# Patient Record
Sex: MALE | Race: WHITE | Employment: OTHER | URBAN - METROPOLITAN AREA
[De-identification: names, ages, dates, MRNs, and addresses within clinical notes are randomized per-mention and may not be internally consistent; named-entity substitution may affect disease eponyms.]

---

## 2017-02-14 ENCOUNTER — COMMUNICATION - HEALTHEAST (OUTPATIENT)
Dept: INTERNAL MEDICINE | Facility: CLINIC | Age: 62
End: 2017-02-14

## 2017-05-08 ENCOUNTER — COMMUNICATION - HEALTHEAST (OUTPATIENT)
Dept: INTERNAL MEDICINE | Facility: CLINIC | Age: 62
End: 2017-05-08

## 2017-07-21 ENCOUNTER — COMMUNICATION - HEALTHEAST (OUTPATIENT)
Dept: INTERNAL MEDICINE | Facility: CLINIC | Age: 62
End: 2017-07-21

## 2017-07-21 ENCOUNTER — OFFICE VISIT - HEALTHEAST (OUTPATIENT)
Dept: INTERNAL MEDICINE | Facility: CLINIC | Age: 62
End: 2017-07-21

## 2017-07-21 DIAGNOSIS — E78.00 HYPERCHOLESTEREMIA: ICD-10-CM

## 2017-07-21 DIAGNOSIS — I10 ESSENTIAL HYPERTENSION: ICD-10-CM

## 2017-07-21 DIAGNOSIS — Z00.00 ROUTINE GENERAL MEDICAL EXAMINATION AT A HEALTH CARE FACILITY: ICD-10-CM

## 2017-07-21 DIAGNOSIS — H91.93 HEARING LOSS, BILATERAL: ICD-10-CM

## 2017-07-21 DIAGNOSIS — M17.0 PRIMARY OSTEOARTHRITIS OF BOTH KNEES: ICD-10-CM

## 2017-07-21 LAB
CHOLEST SERPL-MCNC: 184 MG/DL
FASTING STATUS PATIENT QL REPORTED: YES
HCV AB SERPL QL IA: NEGATIVE
HDLC SERPL-MCNC: 42 MG/DL
LDLC SERPL CALC-MCNC: 122 MG/DL
PSA SERPL-MCNC: 1 NG/ML (ref 0–4.5)
TRIGL SERPL-MCNC: 102 MG/DL

## 2017-07-21 ASSESSMENT — MIFFLIN-ST. JEOR: SCORE: 1736.08

## 2017-11-16 ENCOUNTER — COMMUNICATION - HEALTHEAST (OUTPATIENT)
Dept: INTERNAL MEDICINE | Facility: CLINIC | Age: 62
End: 2017-11-16

## 2018-08-09 ENCOUNTER — COMMUNICATION - HEALTHEAST (OUTPATIENT)
Dept: INTERNAL MEDICINE | Facility: CLINIC | Age: 63
End: 2018-08-09

## 2018-08-09 DIAGNOSIS — I10 HTN (HYPERTENSION): ICD-10-CM

## 2018-11-06 ENCOUNTER — COMMUNICATION - HEALTHEAST (OUTPATIENT)
Dept: INTERNAL MEDICINE | Facility: CLINIC | Age: 63
End: 2018-11-06

## 2018-11-06 DIAGNOSIS — I10 HTN (HYPERTENSION): ICD-10-CM

## 2018-11-16 ENCOUNTER — OFFICE VISIT - HEALTHEAST (OUTPATIENT)
Dept: INTERNAL MEDICINE | Facility: CLINIC | Age: 63
End: 2018-11-16

## 2018-11-16 DIAGNOSIS — E78.00 HYPERCHOLESTEREMIA: ICD-10-CM

## 2018-11-16 DIAGNOSIS — Z00.00 INITIAL MEDICARE ANNUAL WELLNESS VISIT: ICD-10-CM

## 2018-11-16 DIAGNOSIS — Z12.5 SCREENING FOR PROSTATE CANCER: ICD-10-CM

## 2018-11-16 DIAGNOSIS — I10 ESSENTIAL HYPERTENSION: ICD-10-CM

## 2018-11-16 DIAGNOSIS — H91.93 BILATERAL HEARING LOSS, UNSPECIFIED HEARING LOSS TYPE: ICD-10-CM

## 2018-11-16 DIAGNOSIS — M17.0 PRIMARY OSTEOARTHRITIS OF BOTH KNEES: ICD-10-CM

## 2018-11-16 DIAGNOSIS — Z82.49 FAMILY HISTORY OF PREMATURE CORONARY ARTERY DISEASE: ICD-10-CM

## 2018-11-16 ASSESSMENT — MIFFLIN-ST. JEOR: SCORE: 1749.69

## 2018-12-14 ENCOUNTER — AMBULATORY - HEALTHEAST (OUTPATIENT)
Dept: INTERNAL MEDICINE | Facility: CLINIC | Age: 63
End: 2018-12-14

## 2018-12-14 ENCOUNTER — OFFICE VISIT - HEALTHEAST (OUTPATIENT)
Dept: INTERNAL MEDICINE | Facility: CLINIC | Age: 63
End: 2018-12-14

## 2018-12-14 ENCOUNTER — COMMUNICATION - HEALTHEAST (OUTPATIENT)
Dept: INTERNAL MEDICINE | Facility: CLINIC | Age: 63
End: 2018-12-14

## 2018-12-14 DIAGNOSIS — I10 ESSENTIAL HYPERTENSION: ICD-10-CM

## 2018-12-14 DIAGNOSIS — Z12.5 SCREENING FOR PROSTATE CANCER: ICD-10-CM

## 2018-12-14 DIAGNOSIS — E78.00 HYPERCHOLESTEREMIA: ICD-10-CM

## 2018-12-14 DIAGNOSIS — Z51.81 MEDICATION MONITORING ENCOUNTER: ICD-10-CM

## 2018-12-14 DIAGNOSIS — R73.01 IMPAIRED FASTING GLUCOSE: ICD-10-CM

## 2018-12-14 LAB
ALBUMIN SERPL-MCNC: 3.9 G/DL (ref 3.5–5)
ALBUMIN UR-MCNC: NEGATIVE MG/DL
ALP SERPL-CCNC: 64 U/L (ref 45–120)
ALT SERPL W P-5'-P-CCNC: 25 U/L (ref 0–45)
ANION GAP SERPL CALCULATED.3IONS-SCNC: 10 MMOL/L (ref 5–18)
APPEARANCE UR: CLEAR
AST SERPL W P-5'-P-CCNC: 22 U/L (ref 0–40)
BACTERIA #/AREA URNS HPF: ABNORMAL HPF
BILIRUB SERPL-MCNC: 0.9 MG/DL (ref 0–1)
BILIRUB UR QL STRIP: NEGATIVE
BUN SERPL-MCNC: 16 MG/DL (ref 8–22)
CALCIUM SERPL-MCNC: 9.9 MG/DL (ref 8.5–10.5)
CHLORIDE BLD-SCNC: 100 MMOL/L (ref 98–107)
CHOLEST SERPL-MCNC: 201 MG/DL
CO2 SERPL-SCNC: 28 MMOL/L (ref 22–31)
COLOR UR AUTO: YELLOW
CREAT SERPL-MCNC: 0.88 MG/DL (ref 0.7–1.3)
FASTING STATUS PATIENT QL REPORTED: YES
GFR SERPL CREATININE-BSD FRML MDRD: >60 ML/MIN/1.73M2
GLUCOSE BLD-MCNC: 113 MG/DL (ref 70–125)
GLUCOSE UR STRIP-MCNC: NEGATIVE MG/DL
HDLC SERPL-MCNC: 45 MG/DL
HGB BLD-MCNC: 16.3 G/DL (ref 14–18)
HGB UR QL STRIP: ABNORMAL
KETONES UR STRIP-MCNC: NEGATIVE MG/DL
LDLC SERPL CALC-MCNC: 136 MG/DL
LEUKOCYTE ESTERASE UR QL STRIP: NEGATIVE
MAGNESIUM SERPL-MCNC: 2 MG/DL (ref 1.8–2.6)
NITRATE UR QL: NEGATIVE
PH UR STRIP: 7 [PH] (ref 5–8)
POTASSIUM BLD-SCNC: 4.8 MMOL/L (ref 3.5–5)
PROT SERPL-MCNC: 7.4 G/DL (ref 6–8)
PSA SERPL-MCNC: 1.5 NG/ML (ref 0–4.5)
RBC #/AREA URNS AUTO: ABNORMAL HPF
SODIUM SERPL-SCNC: 138 MMOL/L (ref 136–145)
SP GR UR STRIP: 1.01 (ref 1–1.03)
SQUAMOUS #/AREA URNS AUTO: ABNORMAL LPF
TRIGL SERPL-MCNC: 101 MG/DL
UROBILINOGEN UR STRIP-ACNC: ABNORMAL
WBC #/AREA URNS AUTO: ABNORMAL HPF

## 2018-12-14 ASSESSMENT — MIFFLIN-ST. JEOR: SCORE: 1727.01

## 2019-02-04 ENCOUNTER — AMBULATORY - HEALTHEAST (OUTPATIENT)
Dept: INTERNAL MEDICINE | Facility: CLINIC | Age: 64
End: 2019-02-04

## 2019-02-04 ENCOUNTER — COMMUNICATION - HEALTHEAST (OUTPATIENT)
Dept: SCHEDULING | Facility: CLINIC | Age: 64
End: 2019-02-04

## 2019-02-28 ENCOUNTER — COMMUNICATION - HEALTHEAST (OUTPATIENT)
Dept: SCHEDULING | Facility: CLINIC | Age: 64
End: 2019-02-28

## 2019-03-05 ENCOUNTER — OFFICE VISIT - HEALTHEAST (OUTPATIENT)
Dept: CARDIOLOGY | Facility: CLINIC | Age: 64
End: 2019-03-05

## 2019-03-05 DIAGNOSIS — I10 ESSENTIAL HYPERTENSION: ICD-10-CM

## 2019-03-05 DIAGNOSIS — R07.89 OTHER CHEST PAIN: ICD-10-CM

## 2019-03-05 LAB
ATRIAL RATE - MUSE: 72 BPM
DIASTOLIC BLOOD PRESSURE - MUSE: NORMAL MMHG
INTERPRETATION ECG - MUSE: NORMAL
P AXIS - MUSE: 54 DEGREES
PR INTERVAL - MUSE: 148 MS
QRS DURATION - MUSE: 90 MS
QT - MUSE: 368 MS
QTC - MUSE: 402 MS
R AXIS - MUSE: 42 DEGREES
SYSTOLIC BLOOD PRESSURE - MUSE: NORMAL MMHG
T AXIS - MUSE: 65 DEGREES
VENTRICULAR RATE- MUSE: 72 BPM

## 2019-03-05 ASSESSMENT — MIFFLIN-ST. JEOR: SCORE: 1704.33

## 2019-03-07 ENCOUNTER — HOSPITAL ENCOUNTER (OUTPATIENT)
Dept: NUCLEAR MEDICINE | Facility: CLINIC | Age: 64
Discharge: HOME | End: 2019-03-07
Attending: INTERNAL MEDICINE

## 2019-03-07 ENCOUNTER — HOSPITAL ENCOUNTER (OUTPATIENT)
Dept: CARDIOLOGY | Facility: CLINIC | Age: 64
Discharge: HOME | End: 2019-03-07
Attending: INTERNAL MEDICINE

## 2019-03-07 LAB
AORTIC ROOT: 3.6 CM
ASCENDING AORTA: 4.1 CM
BSA FOR ECHO PROCEDURE: 2.14 M2
CV ECHO HEIGHT: 69 IN
CV ECHO WEIGHT: 207 LBS
CV STRESS CURRENT BP HE: NORMAL
CV STRESS CURRENT HR HE: 101
CV STRESS CURRENT HR HE: 102
CV STRESS CURRENT HR HE: 104
CV STRESS CURRENT HR HE: 105
CV STRESS CURRENT HR HE: 105
CV STRESS CURRENT HR HE: 107
CV STRESS CURRENT HR HE: 109
CV STRESS CURRENT HR HE: 110
CV STRESS CURRENT HR HE: 116
CV STRESS CURRENT HR HE: 121
CV STRESS CURRENT HR HE: 122
CV STRESS CURRENT HR HE: 123
CV STRESS CURRENT HR HE: 131
CV STRESS CURRENT HR HE: 132
CV STRESS CURRENT HR HE: 132
CV STRESS CURRENT HR HE: 136
CV STRESS CURRENT HR HE: 137
CV STRESS CURRENT HR HE: 80
CV STRESS CURRENT HR HE: 92
CV STRESS CURRENT HR HE: 94
CV STRESS CURRENT HR HE: 94
CV STRESS CURRENT HR HE: 96
CV STRESS CURRENT HR HE: 98
CV STRESS CURRENT HR HE: 98
CV STRESS DEVIATION TIME HE: NORMAL
CV STRESS ECHO PERCENT HR HE: NORMAL
CV STRESS EXERCISE STAGE HE: NORMAL
CV STRESS EXERCISE STAGE REACHED HE: NORMAL
CV STRESS FINAL RESTING BP HE: NORMAL
CV STRESS FINAL RESTING HR HE: 94
CV STRESS MAX HR HE: 137
CV STRESS MAX TREADMILL GRADE HE: 14
CV STRESS MAX TREADMILL SPEED HE: 3.4
CV STRESS PEAK DIA BP HE: NORMAL
CV STRESS PEAK SYS BP HE: NORMAL
CV STRESS PHASE HE: NORMAL
CV STRESS PROTOCOL HE: NORMAL
CV STRESS RESTING PT POSITION HE: NORMAL
CV STRESS RESTING PT POSITION HE: NORMAL
CV STRESS ST DEVIATION AMOUNT HE: NORMAL
CV STRESS ST DEVIATION ELEVATION HE: NORMAL
CV STRESS ST EVELATION AMOUNT HE: NORMAL
CV STRESS TEST TYPE HE: NORMAL
CV STRESS TOTAL STAGE TIME MIN 1 HE: NORMAL
DOP CALC LVOT AREA: 3.8 CM2
DOP CALC LVOT DIAMETER: 2.2 CM
DOP CALC LVOT PEAK VEL: 84.4 CM/S
DOP CALC LVOT STROKE VOLUME: 71 CM3
DOP CALCLVOT PEAK VEL VTI: 18.7 CM
EJECTION FRACTION: 69 % (ref 55–75)
FRACTIONAL SHORTENING: 31.3 % (ref 28–44)
INTERVENTRICULAR SEPTUM IN END DIASTOLE: 1.2 CM (ref 0.6–1)
IVS/PW RATIO: 1
LA AREA 1: 12.5 CM2
LA AREA 2: 14.6 CM2
LEFT ATRIUM LENGTH: 4.38 CM
LEFT ATRIUM SIZE: 3.2 CM
LEFT ATRIUM TO AORTIC ROOT RATIO: 0.89 NO UNITS
LEFT ATRIUM VOLUME INDEX: 16.5 ML/M2
LEFT ATRIUM VOLUME: 35.4 ML
LEFT VENTRICLE CARDIAC INDEX: 2.4 L/MIN/M2
LEFT VENTRICLE CARDIAC OUTPUT: 5.2 L/MIN
LEFT VENTRICLE DIASTOLIC VOLUME INDEX: 46.7 CM3/M2 (ref 34–74)
LEFT VENTRICLE DIASTOLIC VOLUME: 100 CM3 (ref 62–150)
LEFT VENTRICLE HEART RATE: 73 BPM
LEFT VENTRICLE MASS INDEX: 102.4 G/M2
LEFT VENTRICLE SYSTOLIC VOLUME INDEX: 14.5 CM3/M2 (ref 11–31)
LEFT VENTRICLE SYSTOLIC VOLUME: 31 CM3 (ref 21–61)
LEFT VENTRICULAR INTERNAL DIMENSION IN DIASTOLE: 4.8 CM (ref 4.2–5.8)
LEFT VENTRICULAR INTERNAL DIMENSION IN SYSTOLE: 3.3 CM (ref 2.5–4)
LEFT VENTRICULAR MASS: 219.1 G
LEFT VENTRICULAR OUTFLOW TRACT MEAN GRADIENT: 1 MMHG
LEFT VENTRICULAR OUTFLOW TRACT MEAN VELOCITY: 55.5 CM/S
LEFT VENTRICULAR OUTFLOW TRACT PEAK GRADIENT: 3 MMHG
LEFT VENTRICULAR POSTERIOR WALL IN END DIASTOLE: 1.2 CM (ref 0.6–1)
LV STROKE VOLUME INDEX: 33.2 ML/M2
MITRAL VALVE E/A RATIO: 0.7
MV AVERAGE E/E' RATIO: 7.7 CM/S
MV DECELERATION TIME: 359 MS
MV E'TISSUE VEL-LAT: 10.8 CM/S
MV E'TISSUE VEL-MED: 7.9 CM/S
MV LATERAL E/E' RATIO: 6.6
MV MEDIAL E/E' RATIO: 9.1
MV PEAK A VELOCITY: 104 CM/S
MV PEAK E VELOCITY: 71.8 CM/S
NUC REST DIASTOLIC VOLUME INDEX: 3312 LBS
NUC REST SYSTOLIC VOLUME INDEX: 69 IN
NUC STRESS EJECTION FRACTION: 63 %
RIGHT VENTRICULAR INTERNAL DIMENSION IN DYSTOLE: 3.5 CM
STRESS ECHO BASELINE BP: NORMAL
STRESS ECHO BASELINE HR: 102
STRESS ECHO CALCULATED PERCENT HR: 88 %
STRESS ECHO LAST STRESS BP: NORMAL
STRESS ECHO LAST STRESS HR: 137
STRESS ECHO POST ESTIMATED WORKLOAD: 10.3
STRESS ECHO POST EXERCISE DUR MIN: 8
STRESS ECHO POST EXERCISE DUR SEC: 59
STRESS ECHO TARGET HR: 132
TRICUSPID VALVE ANULAR PLANE SYSTOLIC EXCURSION: 1.8 CM

## 2019-03-07 ASSESSMENT — MIFFLIN-ST. JEOR: SCORE: 1704.33

## 2019-04-11 ENCOUNTER — OFFICE VISIT - HEALTHEAST (OUTPATIENT)
Dept: CARDIOLOGY | Facility: CLINIC | Age: 64
End: 2019-04-11

## 2019-04-11 DIAGNOSIS — I71.40 ABDOMINAL AORTIC ANEURYSM (H): ICD-10-CM

## 2019-04-11 DIAGNOSIS — I10 ESSENTIAL HYPERTENSION: ICD-10-CM

## 2019-04-11 ASSESSMENT — MIFFLIN-ST. JEOR: SCORE: 1717.93

## 2019-08-05 ENCOUNTER — AMBULATORY - HEALTHEAST (OUTPATIENT)
Dept: INTERNAL MEDICINE | Facility: CLINIC | Age: 64
End: 2019-08-05

## 2019-08-05 ENCOUNTER — COMMUNICATION - HEALTHEAST (OUTPATIENT)
Dept: INTERNAL MEDICINE | Facility: CLINIC | Age: 64
End: 2019-08-05

## 2019-08-05 DIAGNOSIS — I10 ESSENTIAL HYPERTENSION: ICD-10-CM

## 2019-08-20 ENCOUNTER — OFFICE VISIT - HEALTHEAST (OUTPATIENT)
Dept: FAMILY MEDICINE | Facility: CLINIC | Age: 64
End: 2019-08-20

## 2019-08-20 ENCOUNTER — COMMUNICATION - HEALTHEAST (OUTPATIENT)
Dept: SCHEDULING | Facility: CLINIC | Age: 64
End: 2019-08-20

## 2019-08-20 DIAGNOSIS — J40 BRONCHITIS: ICD-10-CM

## 2019-11-05 ENCOUNTER — OFFICE VISIT - HEALTHEAST (OUTPATIENT)
Dept: INTERNAL MEDICINE | Facility: CLINIC | Age: 64
End: 2019-11-05

## 2019-11-05 DIAGNOSIS — Z12.5 SCREENING FOR MALIGNANT NEOPLASM OF PROSTATE: ICD-10-CM

## 2019-11-05 DIAGNOSIS — I10 ESSENTIAL HYPERTENSION: ICD-10-CM

## 2019-11-05 DIAGNOSIS — R07.9 LEFT-SIDED CHEST PAIN: ICD-10-CM

## 2019-11-05 DIAGNOSIS — E78.00 HYPERCHOLESTEREMIA: ICD-10-CM

## 2019-11-05 DIAGNOSIS — Z86.0100 PERSONAL HISTORY OF COLONIC POLYPS: ICD-10-CM

## 2019-11-05 DIAGNOSIS — R73.01 IMPAIRED FASTING GLUCOSE: ICD-10-CM

## 2019-11-05 DIAGNOSIS — Z00.00 MEDICARE ANNUAL WELLNESS VISIT, SUBSEQUENT: ICD-10-CM

## 2019-11-05 DIAGNOSIS — I77.810 ASCENDING AORTA DILATATION (H): ICD-10-CM

## 2019-11-05 DIAGNOSIS — M17.0 PRIMARY OSTEOARTHRITIS OF BOTH KNEES: ICD-10-CM

## 2019-11-05 DIAGNOSIS — Z82.49 FAMILY HISTORY OF PREMATURE CORONARY ARTERY DISEASE: ICD-10-CM

## 2019-11-05 ASSESSMENT — MIFFLIN-ST. JEOR: SCORE: 1740.61

## 2019-11-12 ENCOUNTER — AMBULATORY - HEALTHEAST (OUTPATIENT)
Dept: LAB | Facility: CLINIC | Age: 64
End: 2019-11-12

## 2019-11-12 DIAGNOSIS — R73.01 IMPAIRED FASTING GLUCOSE: ICD-10-CM

## 2019-11-12 DIAGNOSIS — I10 ESSENTIAL HYPERTENSION: ICD-10-CM

## 2019-11-12 DIAGNOSIS — Z12.5 SCREENING FOR MALIGNANT NEOPLASM OF PROSTATE: ICD-10-CM

## 2019-11-12 DIAGNOSIS — E78.00 HYPERCHOLESTEREMIA: ICD-10-CM

## 2019-11-12 LAB
ALBUMIN SERPL-MCNC: 3.9 G/DL (ref 3.5–5)
ALBUMIN UR-MCNC: NEGATIVE MG/DL
ALP SERPL-CCNC: 75 U/L (ref 45–120)
ALT SERPL W P-5'-P-CCNC: 17 U/L (ref 0–45)
ANION GAP SERPL CALCULATED.3IONS-SCNC: 11 MMOL/L (ref 5–18)
APPEARANCE UR: CLEAR
AST SERPL W P-5'-P-CCNC: 22 U/L (ref 0–40)
BACTERIA #/AREA URNS HPF: ABNORMAL HPF
BILIRUB SERPL-MCNC: 0.5 MG/DL (ref 0–1)
BILIRUB UR QL STRIP: NEGATIVE
BUN SERPL-MCNC: 12 MG/DL (ref 8–22)
CALCIUM SERPL-MCNC: 9.2 MG/DL (ref 8.5–10.5)
CHLORIDE BLD-SCNC: 104 MMOL/L (ref 98–107)
CHOLEST SERPL-MCNC: 200 MG/DL
CO2 SERPL-SCNC: 25 MMOL/L (ref 22–31)
COLOR UR AUTO: YELLOW
CREAT SERPL-MCNC: 0.89 MG/DL (ref 0.7–1.3)
ERYTHROCYTE [DISTWIDTH] IN BLOOD BY AUTOMATED COUNT: 12.2 % (ref 11–14.5)
FASTING STATUS PATIENT QL REPORTED: YES
GFR SERPL CREATININE-BSD FRML MDRD: >60 ML/MIN/1.73M2
GLUCOSE BLD-MCNC: 105 MG/DL (ref 70–125)
GLUCOSE UR STRIP-MCNC: NEGATIVE MG/DL
HBA1C MFR BLD: 5.6 % (ref 3.5–6)
HCT VFR BLD AUTO: 48.3 % (ref 40–54)
HDLC SERPL-MCNC: 40 MG/DL
HGB BLD-MCNC: 16.2 G/DL (ref 14–18)
HGB UR QL STRIP: ABNORMAL
KETONES UR STRIP-MCNC: NEGATIVE MG/DL
LDLC SERPL CALC-MCNC: 140 MG/DL
LEUKOCYTE ESTERASE UR QL STRIP: NEGATIVE
MCH RBC QN AUTO: 30 PG (ref 27–34)
MCHC RBC AUTO-ENTMCNC: 33.5 G/DL (ref 32–36)
MCV RBC AUTO: 90 FL (ref 80–100)
NITRATE UR QL: NEGATIVE
PH UR STRIP: 6.5 [PH] (ref 5–8)
PLATELET # BLD AUTO: 208 THOU/UL (ref 140–440)
PMV BLD AUTO: 7.3 FL (ref 7–10)
POTASSIUM BLD-SCNC: 4.7 MMOL/L (ref 3.5–5)
PROT SERPL-MCNC: 7.3 G/DL (ref 6–8)
PSA SERPL-MCNC: 1.1 NG/ML (ref 0–4.5)
RBC # BLD AUTO: 5.39 MILL/UL (ref 4.4–6.2)
RBC #/AREA URNS AUTO: ABNORMAL HPF
SODIUM SERPL-SCNC: 140 MMOL/L (ref 136–145)
SP GR UR STRIP: 1.01 (ref 1–1.03)
SQUAMOUS #/AREA URNS AUTO: ABNORMAL LPF
TRIGL SERPL-MCNC: 102 MG/DL
UROBILINOGEN UR STRIP-ACNC: ABNORMAL
WBC #/AREA URNS AUTO: ABNORMAL HPF
WBC: 5.9 THOU/UL (ref 4–11)

## 2020-03-02 ENCOUNTER — COMMUNICATION - HEALTHEAST (OUTPATIENT)
Dept: INTERNAL MEDICINE | Facility: CLINIC | Age: 65
End: 2020-03-02

## 2020-07-28 ENCOUNTER — COMMUNICATION - HEALTHEAST (OUTPATIENT)
Dept: INTERNAL MEDICINE | Facility: CLINIC | Age: 65
End: 2020-07-28

## 2020-07-28 DIAGNOSIS — I10 ESSENTIAL HYPERTENSION: ICD-10-CM

## 2020-11-12 ENCOUNTER — COMMUNICATION - HEALTHEAST (OUTPATIENT)
Dept: INTERNAL MEDICINE | Facility: CLINIC | Age: 65
End: 2020-11-12

## 2020-11-12 DIAGNOSIS — I10 ESSENTIAL HYPERTENSION: ICD-10-CM

## 2021-02-17 ENCOUNTER — COMMUNICATION - HEALTHEAST (OUTPATIENT)
Dept: INTERNAL MEDICINE | Facility: CLINIC | Age: 66
End: 2021-02-17

## 2021-02-17 DIAGNOSIS — I10 ESSENTIAL HYPERTENSION: ICD-10-CM

## 2021-03-04 ENCOUNTER — COMMUNICATION - HEALTHEAST (OUTPATIENT)
Dept: ADMINISTRATIVE | Facility: CLINIC | Age: 66
End: 2021-03-04

## 2021-03-05 ENCOUNTER — OFFICE VISIT - HEALTHEAST (OUTPATIENT)
Dept: INTERNAL MEDICINE | Facility: CLINIC | Age: 66
End: 2021-03-05

## 2021-03-05 DIAGNOSIS — Z82.49 FAMILY HISTORY OF PREMATURE CORONARY ARTERY DISEASE: ICD-10-CM

## 2021-03-05 DIAGNOSIS — H91.93 BILATERAL HEARING LOSS, UNSPECIFIED HEARING LOSS TYPE: ICD-10-CM

## 2021-03-05 DIAGNOSIS — I10 ESSENTIAL HYPERTENSION: ICD-10-CM

## 2021-03-05 DIAGNOSIS — E78.00 HYPERCHOLESTEREMIA: ICD-10-CM

## 2021-03-05 DIAGNOSIS — Z00.00 MEDICARE ANNUAL WELLNESS VISIT, SUBSEQUENT: ICD-10-CM

## 2021-03-05 DIAGNOSIS — I77.810 ASCENDING AORTA DILATATION (H): ICD-10-CM

## 2021-03-05 DIAGNOSIS — Z12.5 SCREENING FOR MALIGNANT NEOPLASM OF PROSTATE: ICD-10-CM

## 2021-03-05 LAB
ALBUMIN SERPL-MCNC: 3.9 G/DL (ref 3.5–5)
ALBUMIN UR-MCNC: NEGATIVE MG/DL
ALP SERPL-CCNC: 81 U/L (ref 45–120)
ALT SERPL W P-5'-P-CCNC: 18 U/L (ref 0–45)
ANION GAP SERPL CALCULATED.3IONS-SCNC: 10 MMOL/L (ref 5–18)
APPEARANCE UR: CLEAR
AST SERPL W P-5'-P-CCNC: 26 U/L (ref 0–40)
BILIRUB SERPL-MCNC: 0.7 MG/DL (ref 0–1)
BILIRUB UR QL STRIP: NEGATIVE
BUN SERPL-MCNC: 13 MG/DL (ref 8–22)
CALCIUM SERPL-MCNC: 8.7 MG/DL (ref 8.5–10.5)
CHLORIDE BLD-SCNC: 102 MMOL/L (ref 98–107)
CHOLEST SERPL-MCNC: 173 MG/DL
CO2 SERPL-SCNC: 28 MMOL/L (ref 22–31)
COLOR UR AUTO: YELLOW
CREAT SERPL-MCNC: 0.82 MG/DL (ref 0.7–1.3)
ERYTHROCYTE [DISTWIDTH] IN BLOOD BY AUTOMATED COUNT: 12.3 % (ref 11–14.5)
FASTING STATUS PATIENT QL REPORTED: ABNORMAL
GFR SERPL CREATININE-BSD FRML MDRD: >60 ML/MIN/1.73M2
GLUCOSE BLD-MCNC: 91 MG/DL (ref 70–125)
GLUCOSE UR STRIP-MCNC: NEGATIVE MG/DL
HCT VFR BLD AUTO: 45.3 % (ref 40–54)
HDLC SERPL-MCNC: 38 MG/DL
HGB BLD-MCNC: 15.3 G/DL (ref 14–18)
HGB UR QL STRIP: NEGATIVE
KETONES UR STRIP-MCNC: NEGATIVE MG/DL
LDLC SERPL CALC-MCNC: 113 MG/DL
LEUKOCYTE ESTERASE UR QL STRIP: NEGATIVE
MCH RBC QN AUTO: 29.8 PG (ref 27–34)
MCHC RBC AUTO-ENTMCNC: 33.8 G/DL (ref 32–36)
MCV RBC AUTO: 88 FL (ref 80–100)
NITRATE UR QL: NEGATIVE
PH UR STRIP: 5.5 [PH] (ref 5–8)
PLATELET # BLD AUTO: 208 THOU/UL (ref 140–440)
PMV BLD AUTO: 9.2 FL (ref 7–10)
POTASSIUM BLD-SCNC: 4.4 MMOL/L (ref 3.5–5)
PROT SERPL-MCNC: 7.1 G/DL (ref 6–8)
PSA SERPL-MCNC: 1.1 NG/ML (ref 0–4.5)
RBC # BLD AUTO: 5.14 MILL/UL (ref 4.4–6.2)
SODIUM SERPL-SCNC: 140 MMOL/L (ref 136–145)
SP GR UR STRIP: 1.01 (ref 1–1.03)
TRIGL SERPL-MCNC: 109 MG/DL
UROBILINOGEN UR STRIP-ACNC: NORMAL
WBC: 6.9 THOU/UL (ref 4–11)

## 2021-03-05 ASSESSMENT — MIFFLIN-ST. JEOR: SCORE: 1745.72

## 2021-03-23 ENCOUNTER — COMMUNICATION - HEALTHEAST (OUTPATIENT)
Dept: INTERNAL MEDICINE | Facility: CLINIC | Age: 66
End: 2021-03-23

## 2021-03-23 DIAGNOSIS — I10 ESSENTIAL HYPERTENSION: ICD-10-CM

## 2021-03-24 RX ORDER — LISINOPRIL 20 MG/1
TABLET ORAL
Qty: 135 TABLET | Refills: 3 | Status: SHIPPED | OUTPATIENT
Start: 2021-03-24 | End: 2022-03-15

## 2021-05-31 VITALS — HEIGHT: 69 IN | WEIGHT: 214 LBS | BODY MASS INDEX: 31.7 KG/M2

## 2021-05-31 NOTE — TELEPHONE ENCOUNTER
Medication Question or Clarification  Who is calling: Patient  What medication are you calling about? (include dose and sig) Lisinopril-HCTZ, 20-12.5 mg, one daily  Who prescribed the medication?: Jc Rg MD   What is your question/concern?: Patient believed this medication was the cause of adverse reactions of trouble sleeping and feeling anxious.  His symptoms were enough that the had a cardiac workshop in March, 2019.  There was nothing wrong with his heart.  In May we went back to taking plain lisinopril and found it was not having those reactions.  He did a trial back on the combination drug and reactions reappeared.  He has not been taking plain lisinopril, but increased it to 1 and 1/2 tablet.  His blood pressure has been averaging 110/75.  If this is ok with Dr. Rg, he would like to continue Lisinopril 20 mg, taking one and one-half tablet daily.  He will need a new prescription.  Pharmacy: [facility] #[id number]  Okay to leave a detailed message?: Yes  Site CMT - Please call the pharmacy to obtain any additional needed information.

## 2021-05-31 NOTE — PROGRESS NOTES
Chief Complaint   Patient presents with     Nasal Congestion     started 3 weeks ago     Cough     Ear Fullness     left ear pain and fullness         HPI:      Patient is here for 3-4 wks of cough, sometimes productive, with nasal congestion, followed bye 1-2 days of left ear fullness. No fever, chills, chest pain, shortness of breath. Symptoms are not improving.     ROS: Pertinent ROS noted in HPI.     Allergies   Allergen Reactions     Hydrochlorothiazide      Insomnia, anxiety     Tapentadol Rash       Patient Active Problem List   Diagnosis     Osteoarthritis of both knees     HTN (hypertension)     Hypercholesteremia     Family history of premature coronary artery disease     Inguinal hernia     Hearing loss     Personal history of colonic polyps     Impaired fasting glucose       Family History   Problem Relation Age of Onset     Cancer Mother         d 2014 Breast Cancer multiple myeloma     Cancer Father         neck     Coronary artery disease Father      Hyperlipidemia Father      Bipolar disorder Brother        Social History     Socioeconomic History     Marital status:      Spouse name: Not on file     Number of children: Not on file     Years of education: Not on file     Highest education level: Not on file   Occupational History     Not on file   Social Needs     Financial resource strain: Not on file     Food insecurity:     Worry: Not on file     Inability: Not on file     Transportation needs:     Medical: Not on file     Non-medical: Not on file   Tobacco Use     Smoking status: Never Smoker     Smokeless tobacco: Never Used   Substance and Sexual Activity     Alcohol use: Yes     Comment: weekend 2-3/d     Drug use: Not on file     Sexual activity: Not on file   Lifestyle     Physical activity:     Days per week: Not on file     Minutes per session: Not on file     Stress: Not on file   Relationships     Social connections:     Talks on phone: Not on file     Gets together: Not on file      Attends Latter day service: Not on file     Active member of club or organization: Not on file     Attends meetings of clubs or organizations: Not on file     Relationship status: Not on file     Intimate partner violence:     Fear of current or ex partner: Not on file     Emotionally abused: Not on file     Physically abused: Not on file     Forced sexual activity: Not on file   Other Topics Concern     Not on file   Social History Narrative         with 2 children    Formerly [employer]    [employer] School at [employer]                   Objective:    Vitals:    08/20/19 1723   BP: 164/80   Pulse: 78   Resp: 16   Temp: 98  F (36.7  C)   SpO2: 98%       Gen:NAD  Throat: oropharynx clear, tonsils normal  Ears: TMs clear without effusion, ear canals normal with small cerumen  Nose: no discharge  Neck: No adenopathy  CV: RRR, normal S1S2, no M, R, G  Pulm: CTAB, normal effort      Bronchitis  -     benzonatate (TESSALON) 200 MG capsule; Take 1 capsule (200 mg total) by mouth 3 (three) times a day as needed for cough.  -     doxycycline (VIBRA-TABS) 100 MG tablet; Take 1 tablet (100 mg total) by mouth 2 (two) times a day for 10 days.      With extended duration of symptoms, will treat with Doxycycline. F/u as directed.

## 2021-05-31 NOTE — TELEPHONE ENCOUNTER
Pt called in states he has cough.  The cough started 3 weeks ago.  The cough is not bad today but regular.  Pt states his nose is congested.  No fever.  No cough up blood.  Took some OTC medication and not helping him.  No history of lung disease.  No history of blood clots.  No history of heart disease.  No runny nose, no wheezing, no chest pain.  Can't take deep breath all the time.  The disposition is to be seen with in 3 days.  Care advice given per protocol.  Patient agrees with care advice given.   Pt states he will go to [facility] today.  Agreed to call back if he has additional symptoms or questions.      Valentin Peng RN, [facility] Triage/Med Refill 8/20/2019 2:06 PM        Reason for Disposition    Cough has been present for > 3 weeks    Protocols used: COUGH-A-OH

## 2021-05-31 NOTE — TELEPHONE ENCOUNTER
I have sent a prescription for lisinopril 20 mg to continue 1/2 tablets daily.  Remind him to schedule an annual physical for November/December

## 2021-06-02 VITALS — HEIGHT: 69 IN | BODY MASS INDEX: 30.66 KG/M2 | WEIGHT: 207 LBS

## 2021-06-02 VITALS — BODY MASS INDEX: 32.14 KG/M2 | WEIGHT: 217 LBS | HEIGHT: 69 IN

## 2021-06-02 VITALS — HEIGHT: 69 IN | BODY MASS INDEX: 31.1 KG/M2 | WEIGHT: 210 LBS

## 2021-06-02 VITALS — HEIGHT: 69 IN | WEIGHT: 212 LBS | BODY MASS INDEX: 31.4 KG/M2

## 2021-06-02 VITALS — HEIGHT: 69 IN | WEIGHT: 207 LBS | BODY MASS INDEX: 30.66 KG/M2

## 2021-06-03 VITALS — WEIGHT: 213 LBS | BODY MASS INDEX: 31.45 KG/M2

## 2021-06-03 VITALS
SYSTOLIC BLOOD PRESSURE: 136 MMHG | WEIGHT: 215 LBS | HEIGHT: 69 IN | OXYGEN SATURATION: 97 % | DIASTOLIC BLOOD PRESSURE: 88 MMHG | HEART RATE: 71 BPM | BODY MASS INDEX: 31.84 KG/M2

## 2021-06-03 NOTE — PROGRESS NOTES
Assessment and Plan:       1. Medicare annual wellness visit, subsequent  Immunizations are reviewed and providing Pneumovax 23 and recommending Shingrix.  Living will discussed.  Non-smoker.  Uses alcohol in moderation.  Exercising on a regular basis.  Up to date with colonoscopies and this should be repeated in May 2021.  Prostate exam is normal and I will check a PSA for prostate cancer screening.  Dementia and depression screening completed.  He sees his ophthalmologist regularly and gets glaucoma screening.  Skin exam performed and recommending regular use of sunblock.  Hepatitis C antibody for screening was normal.  Will screen for diabetes with fasting glucose.        2. Ascending aorta dilatation (H)  Echocardiogram April 2019 incidentally found to have mildly dilated thoracic aorta.  Asymptomatic.  Met with cardiology.  Plans for CTA in April 2020    3. Left-sided chest pain  Normal nuclear stress test spring 2019 after ER visit and evaluation by cardiology    4. Essential hypertension  Blood pressure results from home are excellent with systolic consistently under 130 taking 30 mg of lisinopril daily.  Discontinued HCTZ this spring as it was causing side effects.  Blood pressure running higher at the office although trending down when rechecked several times.  I would like him to bring his home blood pressure monitor to the office at his next visit.  Continue sodium restriction.  No additional medication at this time.  - Comprehensive Metabolic Panel; Future  - Urinalysis-UC if Indicated; Future  - HM2(CBC w/o Differential); Future    5. Hypercholesteremia  Recheck lipid profile.  With family history of premature coronary artery disease, I am recommending CT coronary calcium score and we discussed benefits  - Lipid Cascade; Future  - CT Cardiac Calcium Score; Future    6. Family history of premature coronary artery disease  As above  - CT Cardiac Calcium Score; Future    7. Impaired fasting  glucose  Fasting glucose 113 last year in the high normal range.  Will check A1c to evaluate for possible prediabetes  - Glycosylated Hemoglobin A1c; Future    8. Primary osteoarthritis of both knees  He has had both knees replaced    9. Personal history of colonic polyps  Next colonoscopy will be due 2021    10. Screening for malignant neoplasm of prostate    - PSA (Prostatic-Specific Antigen), Annual Screen; Future    Over 25 minutes was spent addressing these chronic and new medical problems beyond time spent performing annual wellness visit with over 50% of the time spent counseling and coordination of care including discussing episode of chest pain earlier this year and work-up including normal stress test and echocardiogram showing mildly dilated ascending aorta, hypertension control, hypercholesterolemia and family history of premature coronary artery disease    The patient's current medical problems were reviewed.    I have had an Advance Directives discussion with the patient.  The following health maintenance schedule was reviewed with the patient and provided in printed form in the after visit summary:   Health Maintenance   Topic Date Due     ZOSTER VACCINES (1 of 2) 10/30/2003     MEDICARE ANNUAL WELLNESS VISIT  11/16/2019     PNEUMOCOCCAL IMMUNIZATION 65+ LOW/MEDIUM RISK (2 of 2 - PPSV23) 11/16/2019     FALL RISK ASSESSMENT  11/05/2020     TD 18+ HE  09/09/2024     ADVANCE CARE PLANNING  11/05/2024     COLONOSCOPY  09/27/2026     HEPATITIS C SCREENING  Completed     INFLUENZA VACCINE RULE BASED  Completed        Subjective:   Chief Complaint: Abdulaziz Rendon is an 64 y.o. male here for an Annual Wellness visit.   HPI: In addition to annual wellness visit, several chronic medical problems and new concerns discussed    Episode of left-sided chest pain this spring with a ER evaluation and subsequent evaluation by cardiology with normal nuclear stress test.  Echocardiogram showing mildly dilated  ascending aorta with plans for 1 year follow-up with CTA.  He attributes some of his symptoms to taking HCTZ which he discontinued.  The blood pressure has been well controlled monitoring it frequently at home with average systolic under 130 taking 30 mg of lisinopril daily.  Feeling much better since stopping the HCTZ.    Family history of premature coronary artery disease and hypercholesterolemia.  Currently not on a statin.  We discussed the role of CT coronary calcium score.    Fasting glucose was noted to be mildly elevated last year at 113.  No personal or family history of diabetes.    Review of Systems:    Please see above.  The rest of the review of systems are negative for all systems.    Patient Care Team:  Jc Rg MD as PCP - General (Internal Medicine)  Jc Rg MD as Assigned PCP     Patient Active Problem List   Diagnosis     Osteoarthritis of both knees     HTN (hypertension)     Hypercholesteremia     Family history of premature coronary artery disease     Inguinal hernia     Hearing loss     Personal history of colonic polyps     Impaired fasting glucose     Ascending aorta dilatation (H)     Past Medical History:   Diagnosis Date     Ascending aorta dilatation (H) 11/5/2019    Found on echocardiogram April 2019.  Plan for CTA chest April 2020     Family history of premature coronary artery disease      Hearing loss     left ear since childhood     Herpes zoster 2014     HTN (hypertension)      Hypercholesteremia      Impaired fasting glucose     Fasting glucose 113 December 2018.  Check A1c at return     Inguinal hernia     right     Left-sided chest pain 11/5/2019    Normal nuclear stress test 2019     Osteoarthritis of both knees     left TKA 1/15     Personal history of colonic polyps     Colonoscopy October 2016 normal     Recurrent shoulder dislocation       Past Surgical History:   Procedure Laterality Date     KNEE ARTHROPLASTY Right 12/2015     TOTAL KNEE  ARTHROPLASTY Left 1/2015     VASECTOMY        Family History   Problem Relation Age of Onset     Cancer Mother         d 2014 Breast Cancer multiple myeloma     Cancer Father         neck     Coronary artery disease Father      Hyperlipidemia Father      Bipolar disorder Brother       Social History     Socioeconomic History     Marital status:      Spouse name: Not on file     Number of children: Not on file     Years of education: Not on file     Highest education level: Not on file   Occupational History     Not on file   Social Needs     Financial resource strain: Not on file     Food insecurity:     Worry: Not on file     Inability: Not on file     Transportation needs:     Medical: Not on file     Non-medical: Not on file   Tobacco Use     Smoking status: Never Smoker     Smokeless tobacco: Never Used   Substance and Sexual Activity     Alcohol use: Yes     Comment: weekend 2-3/d     Drug use: Not on file     Sexual activity: Not on file   Lifestyle     Physical activity:     Days per week: Not on file     Minutes per session: Not on file     Stress: Not on file   Relationships     Social connections:     Talks on phone: Not on file     Gets together: Not on file     Attends Christianity service: Not on file     Active member of club or organization: Not on file     Attends meetings of clubs or organizations: Not on file     Relationship status: Not on file     Intimate partner violence:     Fear of current or ex partner: Not on file     Emotionally abused: Not on file     Physically abused: Not on file     Forced sexual activity: Not on file   Other Topics Concern     Not on file   Social History Narrative         with 2 children 1 granddaughter    Undergraduate [employer]    Law School at [employer]              Current Outpatient Medications   Medication Sig Dispense Refill     ascorbic acid (ASCORBIC ACID WITH DION HIPS) 500 MG tablet Take 500 mg by mouth daily.        "co-enzyme Q-10 30 mg capsule Take 30 mg by mouth 4 (four) times a week.              FLAXSEED ORAL Take by mouth daily.              lisinopril (PRINIVIL,ZESTRIL) 20 MG tablet Take 1.5 tablets (30 mg total) by mouth daily. 135 tablet 3     multivitamin therapeutic (THERAGRAN) tablet Take 1 tablet by mouth daily.       ZINC ACETATE ORAL Take 25 mg by mouth.       No current facility-administered medications for this visit.       Objective:   Vital Signs:   Visit Vitals  /88   Pulse 71   Ht 5' 9\" (1.753 m)   Wt 215 lb (97.5 kg)   SpO2 97%   BMI 31.75 kg/m             PHYSICAL EXAM  EYES: Eyelids, conjunctiva, and sclera were normal. Pupils were normal. Cornea, iris, and lens were normal bilaterally.  HEAD, EARS, NOSE, MOUTH, AND THROAT: Head and face were normal. Nose appearance was normal and there was no discharge. Oropharynx was normal.  NECK: Neck appearance was normal. There were no neck masses and the thyroid was not enlarged and no nodules are felt.  No lymphadenopathy.  RESPIRATORY: Breathing pattern was normal and the chest moved symmetrically.  Percussion/auscultatory percussion was normal.  Lung sounds were normal and there were no rales or wheezes.  CARDIOVASCULAR: Heart rate and rhythm were normal.  S1 and S2 were normal and there were no extra sounds or murmurs. Peripheral pulses in arms and legs were normal.  Jugular venous pressure was normal.  There was no peripheral edema.  No carotid bruits.  GASTROINTESTINAL: The abdomen was normal in contour.  Bowel sounds were present.   Palpation detected no tenderness, mass, or enlarged organs.   RECTAL/PROSTATE: No external lesions.  Sphincter tone normal.  No palpable rectal lesions.  Prostate normal size, smooth, nontender without nodules  MUSCULOSKELETAL: Skeletal configuration was normal and muscle mass was normal for age. Joint appearance was overall normal.  LYMPHATIC: There were no enlarged nodes.  SKIN/HAIR/NAILS: Skin color was normal.  Hair and " nails were normal.There were no skin lesions.  NEUROLOGIC: The patient was alert and oriented to person, place, time, and circumstance. Speech was normal. Cranial nerves were normal. Motor strength was normal for age. The patient was normally coordinated.  Sensation intact.  PSYCHIATRIC:  Mood and affect were normal and the patient had normal recent and remote memory. The patient's judgment and insight were normal.    Assessment Results 11/5/2019   Activities of Daily Living No help needed   Instrumental Activities of Daily Living No help needed   Get Up and Go Score Less than 12 seconds   Mini Cog Total Score 5   Some recent data might be hidden     A Mini-Cog score of 0-2 suggests the possibility of dementia, score of 3-5 suggests no dementia    Identified Health Risks:     The patient was provided with written information regarding signs of hearing loss.  Information regarding advance directives (living lilly), including where he can download the appropriate form, was provided to the patient via the AVS.

## 2021-06-05 VITALS
BODY MASS INDEX: 32.14 KG/M2 | WEIGHT: 217 LBS | DIASTOLIC BLOOD PRESSURE: 88 MMHG | SYSTOLIC BLOOD PRESSURE: 138 MMHG | TEMPERATURE: 99.3 F | HEIGHT: 69 IN | OXYGEN SATURATION: 98 % | HEART RATE: 72 BPM

## 2021-06-10 NOTE — TELEPHONE ENCOUNTER
Refill Approved    Rx renewed per Medication Renewal Policy. Medication was last renewed on 8/5/19.    Karla Berumen, [facility] Triage/Med Refill 7/30/2020     Requested Prescriptions   Pending Prescriptions Disp Refills     lisinopriL (PRINIVIL,ZESTRIL) 20 MG tablet [Pharmacy Med Name: LISINOPRIL 20MG TABLETS] 135 tablet 3     Sig: TAKE 1 AND 1/2 TABLETS(30 MG) BY MOUTH DAILY       Ace Inhibitors Refill Protocol Passed - 7/28/2020  4:03 AM        Passed - PCP or prescribing provider visit in past 12 months       Last office visit with prescriber/PCP: 12/14/2018 Jc Rg MD OR same dept: Visit date not found OR same specialty: 12/14/2018 Jc Rg MD  Last physical: 11/5/2019 Last MTM visit: Visit date not found   Next visit within 3 mo: Visit date not found  Next physical within 3 mo: Visit date not found  Prescriber OR PCP: Jc Rg MD  Last diagnosis associated with med order: 1. Essential hypertension  - lisinopriL (PRINIVIL,ZESTRIL) 20 MG tablet [Pharmacy Med Name: LISINOPRIL 20MG TABLETS]; TAKE 1 AND 1/2 TABLETS(30 MG) BY MOUTH DAILY  Dispense: 135 tablet; Refill: 3    If protocol passes may refill for 12 months if within 3 months of last provider visit (or a total of 15 months).             Passed - Serum Potassium in past 12 months     Lab Results   Component Value Date    Potassium 4.7 11/12/2019             Passed - Blood pressure filed in past 12 months     BP Readings from Last 1 Encounters:   11/05/19 136/88             Passed - Serum Creatinine in past 12 months     Creatinine   Date Value Ref Range Status   11/12/2019 0.89 0.70 - 1.30 mg/dL Final

## 2021-06-12 NOTE — PROGRESS NOTES
Office Visit - Physical   Abdulaziz Rendon   61 y.o.  male    Date of visit: 7/21/2017  Physician: Jc Rg MD     Assessment and Plan   1. Routine general medical examination at a health care facility  Immunizations are reviewed and are up-to-date.  He gets a flu shot every fall.  I would wait on Zostavax as he just had shingles in 2014.  Living will discussed.  Non-smoker.  Uses alcohol in moderation.  Regular exercise discussed.  Up to date with colonoscopies and this should be repeated in 2021 with history of polyps.  Prostate exam is normal and I will check a PSA for prostate cancer screening.   He sees his ophthalmologist every year and gets glaucoma screening.  Skin exam performed and recommending regular use of sunblock.  Hepatitis C antibody for screening.  Will screen for diabetes with fasting glucose.  Checking fasting lipid profile.      - PSA, Annual Screen (Prostatic-Specific Antigen)  - Hepatitis C Antibody (Anti-HCV)    2. Essential hypertension  Excellent blood pressure control with current dose of lisinopril  - Comprehensive Metabolic Panel  - Urinalysis  - Hemoglobin    3. Hypercholesteremia  Recheck lipid profile.  Family history coronary artery disease.  He takes aspirin 81 mg daily  - Lipid [place]    4. Primary osteoarthritis of both knees  He has had both knees replaced    5. Hearing loss, bilateral  Chronic problem but getting worse.  May benefit from hearing aids  - Ambulatory referral to Audiology    Return in about 1 year (around 7/21/2018) for Annual physical.     Chief Complaint   Chief Complaint   Patient presents with     Annual Exam     no concerns        Patient Profile   Social History     Social History Narrative         with 2 children    [facility]    [employer] School at [employer]                    Past Medical History   Patient Active Problem List   Diagnosis     Osteoarthritis of both knees     HTN (hypertension)      Hypercholesteremia     Family history of premature coronary artery disease     Inguinal hernia     Hearing loss     Personal history of colonic polyps       Past Surgical History  He has a past surgical history that includes Vasectomy; Total knee arthroplasty (Left, 1/2015); and Knee Arthroplasty (Right, 12/2015).     History of Present Illness   This 61 y.o. old gentleman is here for an annual physical.  Overall feeling well.  Tries to stay active through the summer and goes to the gym in the winter.  No new specific concerns.  Has done well since getting his knees replaced.  Does have some problems with his hearing which have been chronic.  He has not had them evaluated lately.    Review of Systems: A comprehensive review of systems was negative except as noted.  General: No chronic fatigue, unexpected weight loss or weight gain, fevers, chills, or night sweats  Eyes: No significant change in vision.  Seeing ophthalmologist regularly.  ENT: No ear or sinus infections.  Decreasing hearing  Respiratory: No wheezing, dyspnea on exertion, or chronic cough  Cardiovascular: No chest pain, palpitations, dizziness, or syncope.  No peripheral edema.  GI: No abdominal pain, reflux symptoms, dysphagia, nausea, vomiting, constipation, or diarrhea  : No change in frequency or incontinence.  No hematuria.  Skin: No new rashes or lesions.  Neurologic: No headaches, seizures, dizziness, weakness, or numbness.  Musculoskeletal: No muscle or joint pain.  Lymphatic: No swollen lymph nodes  Psychiatric: No depression, anxiety, or sleep disorder.       Medications and Allergies   Current Outpatient Prescriptions   Medication Sig Dispense Refill     ascorbic acid (ASCORBIC ACID WITH DION HIPS) 500 MG tablet Take 500 mg by mouth daily.       aspirin 81 MG EC tablet Take 81 mg by mouth daily.       co-enzyme Q-10 30 mg capsule Take 30 mg by mouth daily.        FLAXSEED ORAL Take by mouth.       multivitamin therapeutic (THERAGRAN)  "tablet Take 1 tablet by mouth daily.       lisinopril (PRINIVIL,ZESTRIL) 20 MG tablet TAKE 1 TABLET BY MOUTH DAILY 90 tablet 3     No current facility-administered medications for this visit.      Allergies   Allergen Reactions     Tapentadol Rash        Family and Social History   Family History   Problem Relation Age of Onset     Cancer Mother      d 2014 Breast Cancer multiple myeloma     Cancer Father      neck     Coronary artery disease Father      Hyperlipidemia Father      Bipolar disorder Brother         Social History   Substance Use Topics     Smoking status: Never Smoker     Smokeless tobacco: None     Alcohol use Yes      Comment: weekend 2-3/d        Physical Exam   General Appearance:   Well-appearing middle-aged male    /78  Pulse 86  Ht 5' 9\" (1.753 m)  Wt 214 lb (97.1 kg)  SpO2 97%  BMI 31.6 kg/m2    EYES: Eyelids, conjunctiva, and sclera were normal. Pupils were normal. Cornea, iris, and lens were normal bilaterally.  HEAD, EARS, NOSE, MOUTH, AND THROAT: Head and face were normal. Nose appearance was normal and there was no discharge. Oropharynx was normal.  NECK: Neck appearance was normal. There were no neck masses and the thyroid was not enlarged and no nodules are felt.  No lymphadenopathy.  RESPIRATORY: Breathing pattern was normal and the chest moved symmetrically.  Percussion/auscultatory percussion was normal.  Lung sounds were normal and there were no rales or wheezes.  CARDIOVASCULAR: Heart rate and rhythm were normal.  S1 and S2 were normal and there were no extra sounds or murmurs. Peripheral pulses in arms and legs were normal.  Jugular venous pressure was normal.  There was no peripheral edema.  No carotid bruits.  GASTROINTESTINAL: The abdomen was normal in contour.  Bowel sounds were present.  Percussion detected no organ enlargement or tenderness.  Palpation detected no tenderness, mass, or enlarged organs.   RECTAL/PROSTATE: No external lesions.  Sphincter tone normal. "  No palpable rectal lesions.  Prostate normal size, smooth, nontender without palpable lesions.  MUSCULOSKELETAL: Skeletal configuration was normal and muscle mass was normal for age. Joint appearance was overall normal.  LYMPHATIC: There were no enlarged nodes.  SKIN/HAIR/NAILS: Skin color was normal.  There were no skin lesions.  Hair and nails were normal.  NEUROLOGIC: The patient was alert and oriented to person, place, time, and circumstance. Speech was normal. Cranial nerves were normal. Motor strength was normal for age. The patient was normally coordinated.  Sensation intact.  PSYCHIATRIC:  Mood and affect were normal and the patient had normal recent and remote memory. The patient's judgment and insight were normal.       Additional Information        Jc Rg MD  Internal Medicine  Contact me at [phone]

## 2021-06-15 NOTE — PROGRESS NOTES
Assessment and Plan:     Patient has been advised of split billing requirements and indicates understanding: Yes     1. Medicare annual wellness visit, subsequent  Immunizations are reviewed and recommending Shingrix.  He can obtain this at his pharmacy.  Living will discussed.  Non-smoker.  Uses alcohol in moderation.  Regular exercise discussed.  Up to date with colonoscopies and this should be repeated in October 2021.  Prostate exam is normal and I will check a PSA for prostate cancer screening.  Dementia and depression screening completed.  Recommending that he sees his ophthalmologist every year. Skin exam performed and recommending regular use of sunblock.  Recommending dermatology visit every 1 to 2 years.  Hepatitis C antibody for screening was normal.  Will screen for diabetes with fasting glucose.      2. Essential hypertension  Blood pressure looks reasonably well controlled with current dose of lisinopril taking 30 mg daily.  He will work at continued sodium restriction, getting more regular exercise with a goal of losing weight which should further improve blood pressure control.  - HM2(CBC w/o Differential)  - Comprehensive Metabolic Panel  - Urinalysis-UC if Indicated    3. Ascending aorta dilatation (H)  Mildly dilated ascending thoracic aorta found on echocardiogram in 2019.  Due for follow-up scan.  He will be getting a CT coronary calcium score completed.  May be able to get a look at his aorta with this otherwise he will need a CTA either this year or next.  He remains asymptomatic.    4. Hypercholesteremia  Recheck lipid profile which has been mildly elevated.  Additionally there is family history of premature coronary artery disease.  We discussed getting a CT coronary calcium score completed to help assess his risk and benefit of beginning a statin.  - Lipid [place] FASTING    5. Family history of premature coronary artery disease  As above    6. Bilateral hearing loss, unspecified hearing  loss type  He will pursue hearing aids    7. Screening for malignant neoplasm of prostate    - PSA (Prostatic-Specific Antigen), Annual Screen    Over 30 minutes was spent addressing these chronic and new medical problems beyond time spent performing annual wellness visit    The patient's current medical problems were reviewed.    I have had an Advance Directives discussion with the patient.  The following health maintenance schedule was reviewed with the patient and provided in printed form in the after visit summary:   Health Maintenance   Topic Date Due     ZOSTER VACCINES (1 of 2) 10/30/2003     MEDICARE ANNUAL WELLNESS VISIT  03/05/2022     FALL RISK ASSESSMENT  03/05/2022     TD 18+ HE  09/09/2024     ADVANCE CARE PLANNING  11/05/2024     LIPID  11/12/2024     COLORECTAL CANCER SCREENING  09/27/2026     HEPATITIS C SCREENING  Completed     Pneumococcal Vaccine: 65+ Years  Completed     INFLUENZA VACCINE RULE BASED  Completed     Pneumococcal Vaccine: Pediatrics (0 to 5 Years) and At-Risk Patients (6 to 64 Years)  Aged Out     HEPATITIS B VACCINES  Aged Out        Subjective:   Chief Complaint: Abdulaziz Rendon is an 65 y.o. male here for an Annual Wellness visit.   HPI: In addition to his annual wellness visit, here to follow-up medical problems including hypertension, hypercholesterolemia and ascending aortic dilatation.  See assessment plan for details.    Review of Systems:    Please see above.  The rest of the review of systems are negative for all systems.    Patient Care Team:  Jc Rg MD as PCP - General (Internal Medicine)  Jc Rg MD as Assigned PCP     Patient Active Problem List   Diagnosis     Osteoarthritis of both knees     HTN (hypertension)     Hypercholesteremia     Family history of premature coronary artery disease     Inguinal hernia     Hearing loss     Personal history of colonic polyps     Ascending aorta dilatation (H)     Past Medical History:    Diagnosis Date     Ascending aorta dilatation (H) 11/5/2019    Found on echocardiogram April 2019.  Plan for CTA chest April 2020     Family history of premature coronary artery disease      Hearing loss     left ear since childhood     Herpes zoster 2014     HTN (hypertension)      Hypercholesteremia      Impaired fasting glucose     Fasting glucose 113 December 2018.  Check A1c at return     Inguinal hernia     right     Left-sided chest pain 11/5/2019    Normal nuclear stress test 2019     Osteoarthritis of both knees     left TKA 1/15     Personal history of colonic polyps     Colonoscopy October 2016 normal     Recurrent shoulder dislocation       Past Surgical History:   Procedure Laterality Date     KNEE ARTHROPLASTY Right 12/2015     TOTAL KNEE ARTHROPLASTY Left 1/2015     VASECTOMY        Family History   Problem Relation Age of Onset     Cancer Mother         d 2014 Breast Cancer multiple myeloma     Cancer Father         neck     Coronary artery disease Father      Hyperlipidemia Father      Bipolar disorder Brother       Social History     Socioeconomic History     Marital status:      Spouse name: Not on file     Number of children: Not on file     Years of education: Not on file     Highest education level: Not on file   Occupational History     Not on file   Social Needs     Financial resource strain: Not on file     Food insecurity     Worry: Not on file     Inability: Not on file     Transportation needs     Medical: Not on file     Non-medical: Not on file   Tobacco Use     Smoking status: Never Smoker     Smokeless tobacco: Never Used   Substance and Sexual Activity     Alcohol use: Yes     Comment: weekend 2-3     Drug use: Not on file     Sexual activity: Not on file   Lifestyle     Physical activity     Days per week: Not on file     Minutes per session: Not on file     Stress: Not on file   Relationships     Social connections     Talks on phone: Not on file     Gets together: Not on  "file     Attends Holiness service: Not on file     Active member of club or organization: Not on file     Attends meetings of clubs or organizations: Not on file     Relationship status: Not on file     Intimate partner violence     Fear of current or ex partner: Not on file     Emotionally abused: Not on file     Physically abused: Not on file     Forced sexual activity: Not on file   Other Topics Concern     Not on file   Social History Narrative         with 2 children 1 granddaughter    [employer]    Law School at [employer]              Current Outpatient Medications   Medication Sig Dispense Refill     ascorbic acid (ASCORBIC ACID WITH DION HIPS) 500 MG tablet Take 500 mg by mouth daily.       cholecalciferol, vitamin D3, (VITAMIN D3) 50 mcg (2,000 unit) capsule Take 1,000 Units by mouth daily.       co-enzyme Q-10 30 mg capsule Take 30 mg by mouth 4 (four) times a week.              FLAXSEED ORAL Take by mouth daily.              lisinopriL (PRINIVIL,ZESTRIL) 20 MG tablet TAKE 1 AND 1/2 TABLETS(30 MG) BY MOUTH DAILY.  Follow-up appointment needed for further refills 45 tablet 0     multivitamin therapeutic (THERAGRAN) tablet Take 1 tablet by mouth daily.       ZINC ACETATE ORAL Take 25 mg by mouth.       No current facility-administered medications for this visit.       Objective:   Vital Signs:   Visit Vitals  /88 (Patient Site: Left Arm, Patient Position: Sitting, Cuff Size: Adult Regular)   Pulse 72   Temp 99.3  F (37.4  C) (Tympanic)   Ht 5' 8.75\" (1.746 m)   Wt 217 lb (98.4 kg)   SpO2 98%   BMI 32.28 kg/m           VisionScreening:  No exam data present     PHYSICAL EXAM  EYES: Eyelids, conjunctiva, and sclera were normal. Pupils were normal. Cornea, iris, and lens were normal bilaterally.  HEAD, EARS, NOSE, MOUTH, AND THROAT: Head and face were normal. TMs and external auditory canals are normal  NECK: Neck appearance was normal. There were no neck masses " and the thyroid was not enlarged and no nodules are felt.  No lymphadenopathy.  RESPIRATORY: Breathing pattern was normal and the chest moved symmetrically.   Lung sounds were normal and there were no rales or wheezes.  CARDIOVASCULAR: Heart rate and rhythm were normal.  S1 and S2 were normal and there were no extra sounds or murmurs. Peripheral pulses in arms and legs were normal.  Jugular venous pressure was normal.  There was no peripheral edema.  No carotid bruits.  GASTROINTESTINAL:  Bowel sounds were present.   Palpation detected no tenderness, mass, or enlarged organs.   RECTAL/PROSTATE: No external lesions.  Sphincter tone normal.  No palpable rectal lesions.  Prostate normal size, smooth, nontender without nodules  MUSCULOSKELETAL: Skeletal configuration was normal and muscle mass was normal for age. Joint appearance was overall normal.  LYMPHATIC: There were no enlarged nodes.  SKIN/HAIR/NAILS: Skin color was normal.  Hair and nails were normal.There were no skin lesions.  NEUROLOGIC: The patient was alert and oriented to person, place, time, and circumstance. Speech was normal. Cranial nerves were normal. Motor strength was normal for age. The patient was normally coordinated.  Sensation intact.  PSYCHIATRIC:  Mood and affect were normal and the patient had normal recent and remote memory. The patient's judgment and insight were normal.    Assessment Results 3/5/2021   Activities of Daily Living No help needed   Instrumental Activities of Daily Living No help needed   Get Up and Go Score Less than 12 seconds   Mini Cog Total Score 5   Some recent data might be hidden     A Mini-Cog score of 0-2 suggests the possibility of dementia, score of 3-5 suggests no dementia    Identified Health Risks:     The patient was provided with written information regarding signs of hearing loss.  Information regarding advance directives (living lilly), including where he can download the appropriate form, was provided to  the patient via the AVS.

## 2021-06-15 NOTE — TELEPHONE ENCOUNTER
Called pt and gave him Dr Arenas recommendations.  He said he feels pretty good today.  Had 2nd covid vaccine 3 weeks ago  Svitlana Perez CMA

## 2021-06-15 NOTE — TELEPHONE ENCOUNTER
Reason for Call:  Appointment Question     Detailed comments: Pt has an AWV tomorrow, he has had a cold for a few wks, he's at the back end of it, he's already got the covid shot it will be three weeks by tomorrow. Pt is wondering if its still ok for him to come in for this appt?    Phone Number Patient can be reached at:   Cell number on file:    Telephone Information:   Mobile [phone]       Best Time: anytime    Can we leave a detailed message on this number?: Yes    Call taken on 3/4/2021 at 10:42 AM by Shona Bagley

## 2021-06-15 NOTE — TELEPHONE ENCOUNTER
If it has been more than 14 days since the onset of his symptoms and they seem to be resolving, he should be able to keep his appointment for tomorrow

## 2021-06-16 PROBLEM — I77.810 ASCENDING AORTA DILATATION (H): Status: ACTIVE | Noted: 2019-11-05

## 2021-06-16 NOTE — TELEPHONE ENCOUNTER
Refill Approved    Rx renewed per Medication Renewal Policy. Medication was last renewed on 2/17/21.    [place] [place], [facility] Triage/Med Refill 3/24/2021     Requested Prescriptions   Pending Prescriptions Disp Refills     lisinopriL (PRINIVIL,ZESTRIL) 20 MG tablet [Pharmacy Med Name: LISINOPRIL 20MG TABLETS] 45 tablet 0     Sig: TAKE 1 AND 1/2 TABLETS(30 MG) BY MOUTH DAILY. FOLLOW-UP APPOINTMENT NEEDED FOR FURTHER REFILLS       Ace Inhibitors Refill Protocol Passed - 3/23/2021  4:08 AM        Passed - PCP or prescribing provider visit in past 12 months       Last office visit with prescriber/PCP: Visit date not found OR same dept: Visit date not found OR same specialty: 12/14/2018 Jc Rg MD  Last physical: Visit date not found Last MTM visit: Visit date not found   Next visit within 3 mo: Visit date not found  Next physical within 3 mo: Visit date not found  Prescriber OR PCP: Kenisha Villegas CNP  Last diagnosis associated with med order: 1. Essential hypertension  - lisinopriL (PRINIVIL,ZESTRIL) 20 MG tablet [Pharmacy Med Name: LISINOPRIL 20MG TABLETS]; TAKE 1 AND 1/2 TABLETS(30 MG) BY MOUTH DAILY.  Follow-up appointment needed for further refills  Dispense: 45 tablet; Refill: 0    If protocol passes may refill for 12 months if within 3 months of last provider visit (or a total of 15 months).             Passed - Serum Potassium in past 12 months     Lab Results   Component Value Date    Potassium 4.4 03/05/2021             Passed - Blood pressure filed in past 12 months     BP Readings from Last 1 Encounters:   03/05/21 138/88             Passed - Serum Creatinine in past 12 months     Creatinine   Date Value Ref Range Status   03/05/2021 0.82 0.70 - 1.30 mg/dL Final

## 2021-06-17 NOTE — PATIENT INSTRUCTIONS - HE
Patient Instructions by Jc Rg MD at 11/5/2019  3:00 PM     Author: Jc Rg MD Service: -- Author Type: Physician    Filed: 11/5/2019  3:44 PM Encounter Date: 11/5/2019 Status: Addendum    : Jc Rg MD (Physician)    Related Notes: Original Note by Jc Rg MD (Physician) filed at 11/5/2019  3:33 PM         Patient Education   Signs of Hearing Loss  Hearing loss is a problem shared by many people. In fact, it is one of the most common health conditions, particularly as people age. Most people over age 65 have some hearing loss, and by age 80, almost everyone does. Because hearing loss usually occurs slowly over the years, you may not realize your hearing ability has gotten worse.       Have your hearing checked  Contact your [facility] care provider if you:    Have to strain to hear normal conversation.    Have to watch other peoples faces very carefully to follow what theyre saying.    Need to ask people to repeat what theyve said.    Often misunderstand what people are saying.    Turn the volume of the television or radio up so high that others complain.    Feel that people are mumbling when theyre talking to you.    Find that the effort to hear leaves you feeling tired and irritated.    Notice, when using the phone, that you hear better with 1 ear than the other.    6565-8537 [employer]. [address] [place] [id number]. All rights reserved. This information is not intended as a substitute for professional medical care. Always follow your healthcare professional's instructions.         Patient Education   Understanding Advance Care Planning  Advance care planning is the process of deciding ones own future medical care. It helps ensure that if you cant speak for yourself, your wishes can still be carried out. The plan is a series of legal documents that note a persons wishes. The documents vary by state. Advance care planning may be  done when a person has a serious illness that is expected to get worse. It may be done before major surgery. And it can help you and your family be prepared in case of a major illness or injury. Advance care planning helps with making decisions at these times.       A health care proxy is a person who acts as the voice of a patient when the patient cant speak for himself or herself. The name of this role varies by state. It may be called a Durable Medical Power of  or Durable Power of  for Healthcare. It may be called an agent, surrogate, or advocate. Or it may be called a representative or decision maker. It is an official duty that is identified by a legal document. The document also varies by state.    Why Is Advance Care Planning Important?  If a person communicates their healthcare wishes:    They will be given medical care that matches their values and goals.    Their family members will not be forced to make decisions in a crisis with no guidance.  Creating a Plan  Making an advance care plan is often done in 3 steps:    Thinking about ones wishes. To create an advance care plan, you should think about what kind of medical treatment you would want if you lose the ability to communicate. Are there any situations in which you would refuse or stop treatment? Are there therapies you would want or not want? And whom do you want to make decisions for you? There are many places to learn more about how to plan for your care. Ask your doctor or  for resources.    Picking a health care proxy. This means choosing a trusted person to speak for you only when you cant speak for yourself. When you cannot make medical decisions, your proxy makes sure the instructions in your advance care plan are followed. A proxy does not make decisions based on his or her own opinions. They must put aside those opinions and values if needed, and carry out your wishes.    Filling out the legal documents. There  are several kinds of legal documents for advance care planning. Each one tells health care providers your wishes. The documents may vary by state. They must be signed and may need to be witnessed or notarized. You can cancel or change them whenever you wish. Depending on your state, the documents may include a Healthcare Proxy form, Living Will, Durable Medical Power of , Advance Directive, or others.  The Familys Role  The best help a family can give is to support their loved ones wishes. Open and honest communication is vital. Family should express any concerns they have about the patients choices while the patient can still make decisions.    7338-5070 [employer]. [address] [place] [address]. All rights reserved. This information is not intended as a substitute for professional medical care. Always follow your healthcare professional's instructions.         Also, [facility] offers a free, downloadable health care directive that allows you to share your treatment choices and personal preferences if you cannot communicate your wishes. It also allows you to appoint another person (called a health care agent) to make health care decisions if you are unable to do so. You can download an advance directive by going here: [email or web address]     Patient Education   Personalized Prevention Plan  You are due for the preventive services outlined below.  Your care team is available to assist you in scheduling these services.  If you have already completed any of these items, please share that information with your care team to update in your medical record.  Health Maintenance   Topic Date Due   ? ZOSTER VACCINES (1 of 2) 10/30/2003   ? MEDICARE ANNUAL WELLNESS VISIT  11/05/2020   ? FALL RISK ASSESSMENT  11/05/2020   ? TD 18+ HE  09/09/2024   ? ADVANCE CARE PLANNING  11/05/2024   ? COLONOSCOPY  09/27/2026   ? HEPATITIS C SCREENING  Completed   ?  PNEUMOCOCCAL IMMUNIZATION 65+ LOW/MEDIUM RISK  Completed   ? INFLUENZA VACCINE RULE BASED  Completed         Consider new shingles vaccine, Shingrix.  Check with your insurance for coverage.  Colonoscopy will be due 2021  Schedule CT coronary calcium score to help determine risk for cardiovascular disease  CTA chest in April 2020 to follow-up dilated thoracic aorta

## 2021-06-18 NOTE — PATIENT INSTRUCTIONS - HE
Patient Instructions by Jc Rg MD at 3/5/2021  3:00 PM     Author: Jc Rg MD Service: -- Author Type: Physician    Filed: 3/5/2021  3:42 PM Encounter Date: 3/5/2021 Status: Addendum    : Jc Rg MD (Physician)    Related Notes: Original Note by Jc Rg MD (Physician) filed at 3/5/2021  3:32 PM         Patient Education   Signs of Hearing Loss  Hearing loss is a problem shared by many people. In fact, it is one of the most common health conditions, particularly as people age. Most people over age 65 have some hearing loss, and by age 80, almost everyone does. Because hearing loss usually occurs slowly over the years, you may not realize your hearing ability has gotten worse.       Have your hearing checked  Contact your [facility] care provider if you:    Have to strain to hear normal conversation.    Have to watch other peoples faces very carefully to follow what theyre saying.    Need to ask people to repeat what theyve said.    Often misunderstand what people are saying.    Turn the volume of the television or radio up so high that others complain.    Feel that people are mumbling when theyre talking to you.    Find that the effort to hear leaves you feeling tired and irritated.    Notice, when using the phone, that you hear better with 1 ear than the other.    5359-0497 [employer]. [address] [place] [address]. All rights reserved. This information is not intended as a substitute for professional medical care. Always follow your healthcare professional's instructions.         Patient Education   Understanding Advance Care Planning  Advance care planning is the process of deciding ones own future medical care. It helps ensure that if you cant speak for yourself, your wishes can still be carried out. The plan is a series of legal documents that note a persons wishes. The documents vary by state. Advance care planning may be done  when a person has a serious illness that is expected to get worse. It may be done before major surgery. And it can help you and your family be prepared in case of a major illness or injury. Advance care planning helps with making decisions at these times.       A health care proxy is a person who acts as the voice of a patient when the patient cant speak for himself or herself. The name of this role varies by state. It may be called a Durable Medical Power of  or Durable Power of  for Healthcare. It may be called an agent, surrogate, or advocate. Or it may be called a representative or decision maker. It is an official duty that is identified by a legal document. The document also varies by state.    Why Is Advance Care Planning Important?  If a person communicates their healthcare wishes:    They will be given medical care that matches their values and goals.    Their family members will not be forced to make decisions in a crisis with no guidance.  Creating a Plan  Making an advance care plan is often done in 3 steps:    Thinking about ones wishes. To create an advance care plan, you should think about what kind of medical treatment you would want if you lose the ability to communicate. Are there any situations in which you would refuse or stop treatment? Are there therapies you would want or not want? And whom do you want to make decisions for you? There are many places to learn more about how to plan for your care. Ask your doctor or  for resources.    Picking a health care proxy. This means choosing a trusted person to speak for you only when you cant speak for yourself. When you cannot make medical decisions, your proxy makes sure the instructions in your advance care plan are followed. A proxy does not make decisions based on his or her own opinions. They must put aside those opinions and values if needed, and carry out your wishes.    Filling out the legal documents. There are  several kinds of legal documents for advance care planning. Each one tells health care providers your wishes. The documents may vary by state. They must be signed and may need to be witnessed or notarized. You can cancel or change them whenever you wish. Depending on your state, the documents may include a Healthcare Proxy form, Living Will, Durable Medical Power of , Advance Directive, or others.  The Familys Role  The best help a family can give is to support their loved ones wishes. Open and honest communication is vital. Family should express any concerns they have about the patients choices while the patient can still make decisions.    6996-0009 [employer]. [address] [place] [address]. All rights reserved. This information is not intended as a substitute for professional medical care. Always follow your healthcare professional's instructions.         Also, [facility] [employer] [place] offers a free, downloadable health care directive that allows you to share your treatment choices and personal preferences if you cannot communicate your wishes. It also allows you to appoint another person (called a health care agent) to make health care decisions if you are unable to do so. You can download an advance directive by going here: [email or web address]     Patient Education   Personalized Prevention Plan  You are due for the preventive services outlined below.  Your care team is available to assist you in scheduling these services.  If you have already completed any of these items, please share that information with your care team to update in your medical record.  Health Maintenance   Topic Date Due   ? ZOSTER VACCINES (1 of 2) 10/30/2003   ? MEDICARE ANNUAL WELLNESS VISIT  03/05/2022   ? FALL RISK ASSESSMENT  03/05/2022   ? TD 18+ HE  09/09/2024   ? ADVANCE CARE PLANNING  11/05/2024   ? LIPID  11/12/2024   ? COLORECTAL CANCER SCREENING  09/27/2026   ?  HEPATITIS C SCREENING  Completed   ? Pneumococcal Vaccine: 65+ Years  Completed   ? INFLUENZA VACCINE RULE BASED  Completed   ? Pneumococcal Vaccine: Pediatrics (0 to 5 Years) and At-Risk Patients (6 to 64 Years)  Aged Out   ? HEPATITIS B VACCINES  Aged Out           Continue annual flu shots    Recommending shingles vaccine, Shingrix.  This can be obtained at your pharmacy.    Colonoscopy will be due in October 2021.  Contact [facility] at [phone] to schedule    Eye exam with optometrist or ophthalmologist every year    Consider getting a total body skin exam with a dermatologist every 1 to 2 years.  [facility].    I would recommend scheduling a CT coronary calcium score at [facility] to assess your risk for cardiovascular disease and to determine whether you would benefit from taking a statin.  You can call [phone] to schedule.

## 2021-06-21 NOTE — PROGRESS NOTES
Assessment and Plan:       1. Initial Medicare annual wellness visit  Immunizations are reviewed and discussed Shingrix but I would wait as he had herpes zoster 4 years ago.  Provide Prevnar today and Pneumovax in 1 year.  He has a living will.  Non-smoker.  Uses alcohol in moderation.  Exercising on a regular basis.  Up to date with colonoscopies and this should be repeated in 2021.  Prostate exam is normal and I will check a PSA for prostate cancer screening.  Dementia and depression screening completed.  He sees his ophthalmologist regularly and gets glaucoma screening.  Skin exam performed and recommending regular use of sunblock.  Hepatitis C antibody was normal.  Will screen for diabetes with fasting glucose.        2. Essential hypertension  Blood pressure improved but still not at goal despite 20 mg of lisinopril daily.  Will change to lisinopril/HCTZ 20/12.5 mg daily.  Return in 4 weeks to recheck.  - Urinalysis; Future  - Comprehensive Metabolic Panel; Future  - Hemoglobin; Future    3. Hypercholesteremia  We will check fasting lipid profile.  Currently not on a statin.  - Lipid Profile; Future    4. Family history of premature coronary artery disease  He takes aspirin daily.  Consider statin if cholesterol is not at goal    5. Primary osteoarthritis of both knees  He has had both knees replaced and is doing well    6. Bilateral hearing loss, unspecified hearing loss type  He will schedule an appointment with audiology    7. Screening for prostate cancer    - PSA, Annual Screen (Prostatic-Specific Antigen); Future        The patient's current medical problems were reviewed.    Over 15 minutes was spent addressing these chronic and new medical problems beyond time spent performing annual wellness visit with over 50% of the time spent counseling and coordination of care    I have had an Advance Directives discussion with the patient.  The following health maintenance schedule was reviewed with the patient  and provided in printed form in the after visit summary:   Health Maintenance   Topic Date Due     ZOSTER VACCINES (1 of 2) 10/30/2003     PNEUMOCOCCAL POLYSACCHARIDE VACCINE AGE 65 AND OVER  10/30/2018     FALL RISK ASSESSMENT  11/16/2019     ADVANCE DIRECTIVES DISCUSSED WITH PATIENT  11/16/2023     TD 18+ HE  09/09/2024     COLONOSCOPY  09/27/2026     INFLUENZA VACCINE RULE BASED  Completed     TDAP ADULT ONE TIME DOSE  Completed     PNEUMOCOCCAL CONJUGATE VACCINE FOR ADULTS (PCV13 OR PREVNAR)  Completed        Subjective:   Chief Complaint: Abdulaziz Rendon is an 63 y.o. male here for an Annual Wellness visit.   HPI: In addition to annual wellness visit, chronic medical problems discussed.    He has hypertension and blood pressure is not at goal today.  He does not check outside of the office.  Taking lisinopril 20 mg daily faithfully.  Tries to exercise on a regular basis.    Doing well after replacement of both knees.    Family history coronary artery disease.  He is not on a statin.  He does take aspirin daily.  Discussed pros and cons.    Review of Systems:    Please see above.  The rest of the review of systems are negative for all systems.    Patient Care Team:  Jc Rg MD as PCP - General (Internal Medicine)     Patient Active Problem List   Diagnosis     Osteoarthritis of both knees     HTN (hypertension)     Hypercholesteremia     Family history of premature coronary artery disease     Inguinal hernia     Hearing loss     Personal history of colonic polyps     Past Medical History:   Diagnosis Date     Family history of premature coronary artery disease      Hearing loss     left ear since childhood     Herpes zoster 2014     HTN (hypertension)      Hypercholesteremia      Inguinal hernia     right     Osteoarthritis of both knees     left TKA 1/15     Personal history of colonic polyps     Colonoscopy October 2016 normal     Recurrent shoulder dislocation       Past Surgical History:  "  Procedure Laterality Date     KNEE ARTHROPLASTY Right 12/2015     TOTAL KNEE ARTHROPLASTY Left 1/2015     VASECTOMY        Family History   Problem Relation Age of Onset     Cancer Mother         d 2014 Breast Cancer multiple myeloma     Cancer Father         neck     Coronary artery disease Father      Hyperlipidemia Father      Bipolar disorder Brother       Social History     Socioeconomic History     Marital status:      Spouse name: Not on file     Number of children: Not on file     Years of education: Not on file     Highest education level: Not on file   Social Needs     Financial resource strain: Not on file     Food insecurity - worry: Not on file     Food insecurity - inability: Not on file     Transportation needs - medical: Not on file     Transportation needs - non-medical: Not on file   Occupational History     Not on file   Tobacco Use     Smoking status: Never Smoker     Smokeless tobacco: Never Used   Substance and Sexual Activity     Alcohol use: Yes     Comment: weekend 2-3/d     Drug use: Not on file     Sexual activity: Not on file   Other Topics Concern     Not on file   Social History Narrative         with 2 children    Undergraduate [employer]    [employer] School at [employer]              Current Outpatient Medications   Medication Sig Dispense Refill     ascorbic acid (ASCORBIC ACID WITH DION HIPS) 500 MG tablet Take 500 mg by mouth daily.       aspirin 81 MG EC tablet Take 81 mg by mouth daily.       co-enzyme Q-10 30 mg capsule Take 30 mg by mouth daily.        FLAXSEED ORAL Take by mouth.       lisinopril (PRINIVIL,ZESTRIL) 20 MG tablet TAKE 1 TABLET BY MOUTH EVERY DAY 90 tablet 3     multivitamin therapeutic (THERAGRAN) tablet Take 1 tablet by mouth daily.       No current facility-administered medications for this visit.       Objective:   Vital Signs:   Visit Vitals  /86 (Patient Site: Left Arm, Patient Position: Sitting)   Pulse 97   Ht 5' 9\" " (1.753 m)   Wt 217 lb (98.4 kg)   SpO2 98%   BMI 32.05 kg/m             PHYSICAL EXAM  EYES: Eyelids, conjunctiva, and sclera were normal. Pupils were normal. Cornea, iris, and lens were normal bilaterally.  HEAD, EARS, NOSE, MOUTH, AND THROAT: Head and face were normal. Nose appearance was normal and there was no discharge. Oropharynx was normal.  NECK: Neck appearance was normal. There were no neck masses and the thyroid was not enlarged and no nodules are felt.  No lymphadenopathy.  RESPIRATORY: Breathing pattern was normal and the chest moved symmetrically.  Percussion/auscultatory percussion was normal.  Lung sounds were normal and there were no rales or wheezes.  CARDIOVASCULAR: Heart rate and rhythm were normal.  S1 and S2 were normal and there were no extra sounds or murmurs. Peripheral pulses in arms and legs were normal.  Jugular venous pressure was normal.  There was no peripheral edema.  No carotid bruits.  GASTROINTESTINAL: The abdomen was normal in contour.  Bowel sounds were present.   Palpation detected no tenderness, mass, or enlarged organs.   RECTAL/PROSTATE: No external lesions.  Sphincter tone normal.  No palpable rectal lesions.  Prostate normal size, smooth, nontender without nodules  MUSCULOSKELETAL: Skeletal configuration was normal and muscle mass was normal for age. Joint appearance was overall normal.  LYMPHATIC: There were no enlarged nodes.  SKIN/HAIR/NAILS: Skin color was normal.  Hair and nails were normal.There were no skin lesions.  NEUROLOGIC: The patient was alert and oriented to person, place, time, and circumstance. Speech was normal. Cranial nerves were normal. Motor strength was normal for age. The patient was normally coordinated.  Sensation intact.  PSYCHIATRIC:  Mood and affect were normal and the patient had normal recent and remote memory. The patient's judgment and insight were normal.    Assessment Results 11/16/2018   Activities of Daily Living No help needed    Instrumental Activities of Daily Living No help needed   Get Up and Go Score Less than 12 seconds   Mini Cog Total Score 5   Some recent data might be hidden     A Mini-Cog score of 0-2 suggests the possibility of dementia, score of 3-5 suggests no dementia    Identified Health Risks:     The patient was provided with written information regarding signs of hearing loss.  Patient's advanced directive was discussed and I am comfortable with the patient's wishes.

## 2021-06-22 NOTE — PROGRESS NOTES
Office Visit - Follow Up   Abdulaziz Rendon   63 y.o. male    Date of Visit: 12/14/2018    Chief Complaint   Patient presents with     Hypertension     Labs Only     fasting         Assessment and Plan   1. Essential hypertension  Blood pressures come under significantly improved control changing from lisinopril to lisinopril/HCTZ 20/12.5 mg daily.  Tolerating medication without any significant problem.    2. Medication monitoring encounter  We will check electrolytes including potassium and magnesium along with renal function with addition of diuretic  - Magnesium    Return in about 1 year (around 12/14/2019) for Annual physical.     History of Present Illness   This 63 y.o. old with hypertension who is here to follow-up.  Was seen for his physical last month and blood pressure was not under control despite taking lisinopril daily.  Changed to lisinopril/HCTZ 20/12.5 mg daily.  Tolerating medication without any significant problem.  Denies feeling lightheaded or dizzy.  No increased urinary frequency.  Slight cramp/ache in his legs but nothing too bothersome and he has had previously.  No history of gout.    Review of Systems:  Otherwise, a comprehensive review of systems was negative except as noted.     Medications, Allergies and Problem List   Patient Active Problem List   Diagnosis     Osteoarthritis of both knees     HTN (hypertension)     Hypercholesteremia     Family history of premature coronary artery disease     Inguinal hernia     Hearing loss     Personal history of colonic polyps       He has a past surgical history that includes Vasectomy; Total knee arthroplasty (Left, 1/2015); and Knee Arthroplasty (Right, 12/2015).    Allergies   Allergen Reactions     Tapentadol Rash       Current Outpatient Medications   Medication Sig Dispense Refill     ascorbic acid (ASCORBIC ACID WITH DION HIPS) 500 MG tablet Take 500 mg by mouth daily.       aspirin 81 MG EC tablet Take 81 mg by mouth daily.        "co-enzyme Q-10 30 mg capsule Take 30 mg by mouth daily.        FLAXSEED ORAL Take by mouth.       lisinopril-hydrochlorothiazide (ZESTORETIC) 20-12.5 mg per tablet Take 1 tablet by mouth daily. 90 tablet 3     multivitamin therapeutic (THERAGRAN) tablet Take 1 tablet by mouth daily.       No current facility-administered medications for this visit.         Physical Exam   General Appearance:   Well-appearing middle-aged male    /80 (Patient Site: Left Arm, Patient Position: Sitting, Cuff Size: Adult Regular)   Pulse 79   Ht 5' 9\" (1.753 m)   Wt 212 lb (96.2 kg)   SpO2 96%   BMI 31.31 kg/m               Additional Information   Social History     Tobacco Use     Smoking status: Never Smoker     Smokeless tobacco: Never Used   Substance Use Topics     Alcohol use: Yes     Comment: weekend 2-3/d     Drug use: Not on file              Jc Rg MD  "

## 2021-06-23 NOTE — TELEPHONE ENCOUNTER
Called and left detailed message for the patient relaying message below from Dr. Rg.  Asked the patient to call if he has any further questions.    Leanne CASTRO CMA/JESSICA....................11:43 AM

## 2021-06-23 NOTE — TELEPHONE ENCOUNTER
Pt is asking to be treated for sinus  Sx for a couple weeks   he thought was a cold at first  Symptoms now are tremendous congestion in sinuses  Cough that went away  Raspy voice  No fever   No pain  Tried mucinex but not now as it didn't relieve sx  No sudafed-HTN  Using saline solution    [facility]    Call back  work-pderito Piña, RN [facility] RN Triage      Reason for Disposition    Sinus congestion (pressure, fullness) present > 10 days    Protocols used: SINUS PAIN AND CONGESTION-A-[place]

## 2021-06-24 NOTE — TELEPHONE ENCOUNTER
"Pt calling  Last night had a really tough time sleeping due to left side chest pressure.  Pt states chest pressure is not painful but is noticeable and may be increasing in intensity -   Has felt constant  left side chest pressure for a week .    Pt also states he is having a \"tough time taking a deep breath - my breathing is more shallow than it should be .\"  Does not hurt to take a deep breath just unable to take a deep breath.    Pt has taken 2 trips (flying) to [place] in the last month so has had prolonged sitting time in a plane.  Pt does not have any history of a blood clot but states his mother does.  Denies dizziness, lightheaded, or any radiating pain down arm, shoulder, or jaw.  Pt wondering if he should go to ED to be assessed.    PLAN  Advised Pt, per protocol, he should be assessed in ED.  Pt agrees and states he will go to [facility] ED.  Call back if any further questions or concerns.  Nga Corley RN, [facility] RN Triage/Med Refills    Reason for Disposition    Recent long-distance travel with prolonged time in car, bus, plane, or train (i.e., within past 2 weeks; 6 or more hours duration)    Chest pain lasting longer than 5 minutes    Protocols used: CHEST PAIN-A-OH      "

## 2021-06-27 ENCOUNTER — HEALTH MAINTENANCE LETTER (OUTPATIENT)
Age: 66
End: 2021-06-27

## 2021-06-27 NOTE — PROGRESS NOTES
"Progress Notes by Jordana Patel MD at 3/5/2019  9:10 AM     Author: Jordana Patel MD Service: -- Author Type: Physician    Filed: 3/5/2019 10:39 AM Encounter Date: 3/5/2019 Status: Signed    : Jordana Patel MD (Physician)           Click to link to [facility] HEART CARE NOTE    Thank you, Dr. Giles, for asking us to see Abdulaziz Rendon at the [facility].      Assessment/Recommendations   Assessment:    1.  Chest pain: Chest pain described as a pressure and does seem somewhat atypical in that it has been constant and times and not related to exertion.  Given his risk factors recommend proceeding with further ischemic workup.  We will proceed with a exercise nuclear stress test for further evaluation.  Would also like to proceed with an echocardiogram to evaluate further for structural heart disease given the atypical presentation.  2.  Hypertension: Poorly controlled today.  If remains poorly controlled will recommend increasing the dose of his lisinopril-hydrochlorothiazide  3.  Follow-up in a month       History of Present Illness    Mr. Abdulaziz Rendon is a 63 y.o. male with family history of premature coronary artery disease, hypertension and osteoarthritis who I am seeing today for an initial consultation for chest pain.  He presented to the ED on 2/28/19 for chest discomfort.  EKG and lab workup was unremarkable and he was told to make an appointment here today with a cardiologist.  He reports that he had upper respiratory tract infection symptoms starting in late January that he had trouble recovering from.  He was very congested and had a cough and had trouble sleeping with this.  Since then he has noticed some occasional chest \"pressure\".  Pain is mainly on the left side of his chest nonradiating and not necessarily with exertion.  The pain could last for long periods of time even greater than a day.  He grades " the pain as 1 out of 10 in severity.  He has continued to exercise.  His cardio for 30 minutes and lifts weights 4-5 times a week and has not noted any exertional symptoms.    Twelve-lead EKG today shows normal sinus rhythm at 72 bpm and is a normal-appearing EKG.       Physical Examination Review of Systems   Vitals:    03/05/19 0914   BP: 144/90   Pulse: 76   Resp: 16     Body mass index is 30.57 kg/m .  Wt Readings from Last 3 Encounters:   03/05/19 207 lb (93.9 kg)   12/14/18 212 lb (96.2 kg)   11/16/18 217 lb (98.4 kg)       General Appearance:   alert, no apparent distress   HEENT:  no scleral icterus; the mucous membranes are pink and moist                                  Neck: jugular venous pressure normal   Chest: the spine is straight and the chest is symmetric   Lungs:   respirations unlabored; the lungs are clear to auscultation   Cardiovascular:   regular rhythm with normal first and second heart sounds and no murmurs or gallops;  there are no carotid  bruits.   Abdomen:  no organomegaly, masses, bruits, or tenderness; bowel sounds are present   Extremities: no cyanosis, clubbing, or edema   Skin: no xanthelasma    General: WNL  Eyes: WNL  Ears/Nose/Throat: WNL  Lungs: Cough  Heart: Chest Pain  Stomach: WNL  Bladder: WNL  Muscle/Joints: WNL  Skin: WNL  Nervous System: WNL  Mental Health: Anxiety     Blood: WNL     Medical History  Surgical History Family History Social History   Past Medical History:   Diagnosis Date   ? Family history of premature coronary artery disease    ? Hearing loss     left ear since childhood   ? Herpes zoster 2014   ? HTN (hypertension)    ? Hypercholesteremia    ? Impaired fasting glucose     Fasting glucose 113 December 2018.  Check A1c at return   ? Inguinal hernia     right   ? Osteoarthritis of both knees     left TKA 1/15   ? Personal history of colonic polyps     Colonoscopy October 2016 normal   ? Recurrent shoulder dislocation     Past Surgical History:   Procedure  Laterality Date   ? KNEE ARTHROPLASTY Right 12/2015   ? TOTAL KNEE ARTHROPLASTY Left 1/2015   ? VASECTOMY      Family History   Problem Relation Age of Onset   ? Cancer Mother         d 2014 Breast Cancer multiple myeloma   ? Cancer Father         neck   ? Coronary artery disease Father    ? Hyperlipidemia Father    ? Bipolar disorder Brother     Social History     Socioeconomic History   ? Marital status:      Spouse name: Not on file   ? Number of children: Not on file   ? Years of education: Not on file   ? Highest education level: Not on file   Occupational History   ? Not on file   Social Needs   ? Financial resource strain: Not on file   ? Food insecurity:     Worry: Not on file     Inability: Not on file   ? Transportation needs:     Medical: Not on file     Non-medical: Not on file   Tobacco Use   ? Smoking status: Never Smoker   ? Smokeless tobacco: Never Used   Substance and Sexual Activity   ? Alcohol use: Yes     Comment: weekend 2-3/d   ? Drug use: Not on file   ? Sexual activity: Not on file   Lifestyle   ? Physical activity:     Days per week: Not on file     Minutes per session: Not on file   ? Stress: Not on file   Relationships   ? Social connections:     Talks on phone: Not on file     Gets together: Not on file     Attends Anglican service: Not on file     Active member of club or organization: Not on file     Attends meetings of clubs or organizations: Not on file     Relationship status: Not on file   ? Intimate partner violence:     Fear of current or ex partner: Not on file     Emotionally abused: Not on file     Physically abused: Not on file     Forced sexual activity: Not on file   Other Topics Concern   ? 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 ascorbic acid (ASCORBIC  ACID WITH DION HIPS) 500 MG tablet Take 500 mg by mouth daily.     ? aspirin 81 MG EC tablet Take 81 mg by mouth daily.     ? co-enzyme Q-10 30 mg capsule Take 30 mg by mouth daily.      ? FLAXSEED ORAL Take by mouth.     ? lisinopril-hydrochlorothiazide (ZESTORETIC) 20-12.5 mg per tablet Take 1 tablet by mouth daily. 90 tablet 3   ? multivitamin therapeutic (THERAGRAN) tablet Take 1 tablet by mouth daily.       No current facility-administered medications for this visit.       Allergies   Allergen Reactions   ? Tapentadol Rash         Lab Results    Chemistry/lipid CBC Cardiac Enzymes/BNP/TSH/INR   Lab Results   Component Value Date    CHOL 201 (H) 12/14/2018    HDL 45 12/14/2018    LDLCALC 136 (H) 12/14/2018    TRIG 101 12/14/2018    CREATININE 0.84 02/28/2019    BUN 12 02/28/2019    K 4.0 02/28/2019     02/28/2019     02/28/2019    CO2 30 02/28/2019    Lab Results   Component Value Date    WBC 6.2 02/28/2019    HGB 15.0 02/28/2019    HCT 44.4 02/28/2019    MCV 89 02/28/2019     02/28/2019    Lab Results   Component Value Date    TROPONINI 0.02 02/28/2019    INR 1.09 12/07/2015

## 2021-06-27 NOTE — PROGRESS NOTES
Progress Notes by Jordana Patel MD at 4/11/2019  8:10 AM     Author: Jordana Patel MD Service: -- Author Type: Physician    Filed: 4/11/2019  9:04 AM Encounter Date: 4/11/2019 Status: Signed    : Jordana Patel MD (Physician)           Click to link to [facility] HEART CARE NOTE    Thank you, Dr. Rg, for asking us to see Abdulaziz Rendon at the [facility].      Assessment/Recommendations   Assessment:    1.  Chest pain: Atypical and likely musculoskeletal in nature.  No further ischemic workup needed at this time.  Stress test was negative for inducible ischemia.  2.  Dilated ascending aorta: Proceed with CTA chest in a year for further evaluation.  3.  Hypertension: Mildly elevated today.  We discussed increasing the dose of his lisinopril.  Currently he is on lisinopril 20-hydrochlorothiazide 12.5.  Another option will be for him to monitor his blood pressure at home which he plans on doing.  He will get back to me with these numbers.  Follow-up in a year       History of Present Illness    Mr. Abdulaziz Rendon is a 63 y.o. male with history of hypertension who I am seeing today in follow-up.  I saw him for this consultation when he had complaints of chest discomfort.  He underwent an exercise nuclear stress test and did 9 minutes of Dru protocol without chest pain.  He reports feeling well.  Stress test was negative for inducible ischemia.  He had noted that the left-sided chest discomfort does seem alleviated with ice and is likely musculoskeletal.  Echocardiogram demonstrated a mildly dilated ascending aorta and was otherwise unremarkable.    Exercise nuclear stress test 3/7/2019    The exercise nuclear stress test is negative for inducible myocardial ischemia or infarction.    The left ventricular ejection fraction is 63%.    There is no prior study available.    Echocardiogram 3/7/2019    Left ventricle ejection  fraction is normal. The calculated left ventricular ejection fraction is 69%.    Normal left ventricular size and systolic function.    Normal right ventricular size and systolic function.    The ascending aorta is mildly dilated.    No hemodynamically significant valvular heart abnormalities.    No previous study for comparison.     Physical Examination Review of Systems   Vitals:    04/11/19 0816   BP: 140/90   Pulse: 72   Resp: 16     Body mass index is 31.01 kg/m .  Wt Readings from Last 3 Encounters:   04/11/19 210 lb (95.3 kg)   03/07/19 207 lb (93.9 kg)   03/05/19 207 lb (93.9 kg)       General Appearance:   alert, no apparent distress   HEENT:  no scleral icterus; the mucous membranes are pink and moist                                  Neck: jugular venous pressure normal   Chest: the spine is straight and the chest is symmetric   Lungs:   respirations unlabored; the lungs are clear to auscultation   Cardiovascular:   regular rhythm with normal first and second heart sounds and no murmurs or gallops   Abdomen:  no organomegaly, masses, bruits, or tenderness; bowel sounds are present   Extremities: no cyanosis, clubbing, or edema   Skin: no xanthelasma    General: WNL  Eyes: WNL  Ears/Nose/Throat: WNL  Lungs: WNL  Heart: WNL  Stomach: WNL  Bladder: WNL  Muscle/Joints: WNL  Skin: WNL  Nervous System: WNL  Mental Health: WNL     Blood: WNL     Medical History  Surgical History Family History Social History   Past Medical History:   Diagnosis Date   ? Family history of premature coronary artery disease    ? Hearing loss     left ear since childhood   ? Herpes zoster 2014   ? HTN (hypertension)    ? Hypercholesteremia    ? Impaired fasting glucose     Fasting glucose 113 December 2018.  Check A1c at return   ? Inguinal hernia     right   ? Osteoarthritis of both knees     left TKA 1/15   ? Personal history of colonic polyps     Colonoscopy October 2016 normal   ? Recurrent shoulder dislocation     Past Surgical  History:   Procedure Laterality Date   ? KNEE ARTHROPLASTY Right 12/2015   ? TOTAL KNEE ARTHROPLASTY Left 1/2015   ? VASECTOMY      Family History   Problem Relation Age of Onset   ? Cancer Mother         d 2014 Breast Cancer multiple myeloma   ? Cancer Father         neck   ? Coronary artery disease Father    ? Hyperlipidemia Father    ? Bipolar disorder Brother     Social History     Socioeconomic History   ? Marital status:      Spouse name: Not on file   ? Number of children: Not on file   ? Years of education: Not on file   ? Highest education level: Not on file   Occupational History   ? Not on file   Social Needs   ? Financial resource strain: Not on file   ? Food insecurity:     Worry: Not on file     Inability: Not on file   ? Transportation needs:     Medical: Not on file     Non-medical: Not on file   Tobacco Use   ? Smoking status: Never Smoker   ? Smokeless tobacco: Never Used   Substance and Sexual Activity   ? Alcohol use: Yes     Comment: weekend 2-3/d   ? Drug use: Not on file   ? Sexual activity: Not on file   Lifestyle   ? Physical activity:     Days per week: Not on file     Minutes per session: Not on file   ? Stress: Not on file   Relationships   ? Social connections:     Talks on phone: Not on file     Gets together: Not on file     Attends Islam service: Not on file     Active member of club or organization: Not on file     Attends meetings of clubs or organizations: Not on file     Relationship status: Not on file   ? Intimate partner violence:     Fear of current or ex partner: Not on file     Emotionally abused: Not on file     Physically abused: Not on file     Forced sexual activity: Not on file   Other Topics Concern   ? 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  ascorbic acid (ASCORBIC ACID WITH DION HIPS) 500 MG tablet Take 500 mg by mouth daily.     ? aspirin 81 MG EC tablet Take 81 mg by mouth daily.     ? co-enzyme Q-10 30 mg capsule Take 30 mg by mouth 4 (four) times a week.            ? FLAXSEED ORAL Take by mouth daily.            ? lisinopril-hydrochlorothiazide (ZESTORETIC) 20-12.5 mg per tablet Take 1 tablet by mouth daily. 90 tablet 3   ? multivitamin therapeutic (THERAGRAN) tablet Take 1 tablet by mouth daily.       No current facility-administered medications for this visit.       Allergies   Allergen Reactions   ? Tapentadol Rash         Lab Results    Chemistry/lipid CBC Cardiac Enzymes/BNP/TSH/INR   Lab Results   Component Value Date    CHOL 201 (H) 12/14/2018    HDL 45 12/14/2018    LDLCALC 136 (H) 12/14/2018    TRIG 101 12/14/2018    CREATININE 0.84 02/28/2019    BUN 12 02/28/2019    K 4.0 02/28/2019     02/28/2019     02/28/2019    CO2 30 02/28/2019    Lab Results   Component Value Date    WBC 6.2 02/28/2019    HGB 15.0 02/28/2019    HCT 44.4 02/28/2019    MCV 89 02/28/2019     02/28/2019    Lab Results   Component Value Date    TROPONINI 0.02 02/28/2019    INR 1.09 12/07/2015

## 2021-10-17 ENCOUNTER — HEALTH MAINTENANCE LETTER (OUTPATIENT)
Age: 66
End: 2021-10-17

## 2022-04-25 ENCOUNTER — OFFICE VISIT (OUTPATIENT)
Dept: INTERNAL MEDICINE | Facility: CLINIC | Age: 67
End: 2022-04-25
Payer: COMMERCIAL

## 2022-04-25 VITALS
SYSTOLIC BLOOD PRESSURE: 134 MMHG | HEIGHT: 69 IN | OXYGEN SATURATION: 97 % | HEART RATE: 93 BPM | DIASTOLIC BLOOD PRESSURE: 86 MMHG | WEIGHT: 208 LBS | BODY MASS INDEX: 30.81 KG/M2

## 2022-04-25 DIAGNOSIS — Z12.5 SCREENING FOR PROSTATE CANCER: ICD-10-CM

## 2022-04-25 DIAGNOSIS — I10 PRIMARY HYPERTENSION: ICD-10-CM

## 2022-04-25 DIAGNOSIS — Z82.49 FAMILY HISTORY OF PREMATURE CORONARY ARTERY DISEASE: ICD-10-CM

## 2022-04-25 DIAGNOSIS — Z00.00 ENCOUNTER FOR MEDICARE ANNUAL WELLNESS EXAM: Primary | ICD-10-CM

## 2022-04-25 DIAGNOSIS — E78.00 HYPERCHOLESTEREMIA: ICD-10-CM

## 2022-04-25 DIAGNOSIS — M25.552 ACUTE HIP PAIN, LEFT: ICD-10-CM

## 2022-04-25 DIAGNOSIS — H91.93 BILATERAL HEARING LOSS, UNSPECIFIED HEARING LOSS TYPE: ICD-10-CM

## 2022-04-25 DIAGNOSIS — I77.810 ASCENDING AORTA DILATATION (H): ICD-10-CM

## 2022-04-25 DIAGNOSIS — Z86.0100 PERSONAL HISTORY OF COLONIC POLYPS: ICD-10-CM

## 2022-04-25 LAB
ALBUMIN SERPL-MCNC: 3.9 G/DL (ref 3.5–5)
ALBUMIN UR-MCNC: NEGATIVE MG/DL
ALP SERPL-CCNC: 87 U/L (ref 45–120)
ALT SERPL W P-5'-P-CCNC: 14 U/L (ref 0–45)
ANION GAP SERPL CALCULATED.3IONS-SCNC: 12 MMOL/L (ref 5–18)
APPEARANCE UR: CLEAR
AST SERPL W P-5'-P-CCNC: 20 U/L (ref 0–40)
BACTERIA #/AREA URNS HPF: ABNORMAL /HPF
BILIRUB SERPL-MCNC: 0.9 MG/DL (ref 0–1)
BILIRUB UR QL STRIP: NEGATIVE
BUN SERPL-MCNC: 15 MG/DL (ref 8–22)
CALCIUM SERPL-MCNC: 9.3 MG/DL (ref 8.5–10.5)
CHLORIDE BLD-SCNC: 99 MMOL/L (ref 98–107)
CHOLEST SERPL-MCNC: 178 MG/DL
CO2 SERPL-SCNC: 24 MMOL/L (ref 22–31)
COLOR UR AUTO: YELLOW
CREAT SERPL-MCNC: 0.85 MG/DL (ref 0.7–1.3)
ERYTHROCYTE [DISTWIDTH] IN BLOOD BY AUTOMATED COUNT: 12.3 % (ref 10–15)
FASTING STATUS PATIENT QL REPORTED: YES
GFR SERPL CREATININE-BSD FRML MDRD: >90 ML/MIN/1.73M2
GLUCOSE BLD-MCNC: 98 MG/DL (ref 70–125)
GLUCOSE UR STRIP-MCNC: NEGATIVE MG/DL
HCT VFR BLD AUTO: 46.7 % (ref 40–53)
HDLC SERPL-MCNC: 36 MG/DL
HGB BLD-MCNC: 15.6 G/DL (ref 13.3–17.7)
HGB UR QL STRIP: ABNORMAL
KETONES UR STRIP-MCNC: NEGATIVE MG/DL
LDLC SERPL CALC-MCNC: 107 MG/DL
LEUKOCYTE ESTERASE UR QL STRIP: NEGATIVE
MCH RBC QN AUTO: 29.5 PG (ref 26.5–33)
MCHC RBC AUTO-ENTMCNC: 33.4 G/DL (ref 31.5–36.5)
MCV RBC AUTO: 88 FL (ref 78–100)
NITRATE UR QL: NEGATIVE
PH UR STRIP: 6.5 [PH] (ref 5–8)
PLATELET # BLD AUTO: 240 10E3/UL (ref 150–450)
POTASSIUM BLD-SCNC: 4.6 MMOL/L (ref 3.5–5)
PROT SERPL-MCNC: 7.5 G/DL (ref 6–8)
PSA SERPL-MCNC: 1.02 UG/L (ref 0–4.5)
RBC # BLD AUTO: 5.28 10E6/UL (ref 4.4–5.9)
RBC #/AREA URNS AUTO: ABNORMAL /HPF
SODIUM SERPL-SCNC: 135 MMOL/L (ref 136–145)
SP GR UR STRIP: 1.01 (ref 1–1.03)
SQUAMOUS #/AREA URNS AUTO: ABNORMAL /LPF
TRIGL SERPL-MCNC: 173 MG/DL
UROBILINOGEN UR STRIP-ACNC: 0.2 E.U./DL
WBC # BLD AUTO: 8.1 10E3/UL (ref 4–11)
WBC #/AREA URNS AUTO: ABNORMAL /HPF

## 2022-04-25 PROCEDURE — 80061 LIPID PANEL: CPT | Performed by: INTERNAL MEDICINE

## 2022-04-25 PROCEDURE — 85027 COMPLETE CBC AUTOMATED: CPT | Performed by: INTERNAL MEDICINE

## 2022-04-25 PROCEDURE — 80053 COMPREHEN METABOLIC PANEL: CPT | Performed by: INTERNAL MEDICINE

## 2022-04-25 PROCEDURE — G0103 PSA SCREENING: HCPCS | Performed by: INTERNAL MEDICINE

## 2022-04-25 PROCEDURE — 81001 URINALYSIS AUTO W/SCOPE: CPT | Performed by: INTERNAL MEDICINE

## 2022-04-25 PROCEDURE — G0402 INITIAL PREVENTIVE EXAM: HCPCS | Performed by: INTERNAL MEDICINE

## 2022-04-25 PROCEDURE — 36415 COLL VENOUS BLD VENIPUNCTURE: CPT | Performed by: INTERNAL MEDICINE

## 2022-04-25 PROCEDURE — 99214 OFFICE O/P EST MOD 30 MIN: CPT | Mod: 25 | Performed by: INTERNAL MEDICINE

## 2022-04-25 RX ORDER — LISINOPRIL 40 MG/1
40 TABLET ORAL DAILY
Qty: 90 TABLET | Refills: 3 | Status: SHIPPED | OUTPATIENT
Start: 2022-04-25 | End: 2023-04-17

## 2022-04-25 ASSESSMENT — ACTIVITIES OF DAILY LIVING (ADL): CURRENT_FUNCTION: NO ASSISTANCE NEEDED

## 2022-04-25 NOTE — PATIENT INSTRUCTIONS
Annual flu shot every fall    I would recommend getting the shingles vaccine, Shingrix.  This can be obtained at your pharmacy    I would schedule appointment with an audiologist to get your hearing checked    You are due to get a screening colonoscopy completed.  Please call [facility] at [phone] to schedule    I would also recommend getting a CT coronary calcium score completed.  This can be arranged by calling [facility] at [phone]    Annual eye exam with an ophthalmologist/optometrist      Patient Education   Personalized Prevention Plan  You are due for the preventive services outlined below.  Your care team is available to assist you in scheduling these services.  If you have already completed any of these items, please share that information with your care team to update in your medical record.  Health Maintenance Due   Topic Date Due    Zoster (Shingles) Vaccine (1 of 2) Never done       Signs of Hearing Loss      Hearing much better with one ear can be a sign of hearing loss.   Hearing loss is a problem shared by many people. In fact, it is one of the most common health problems, particularly as people age. Most people age 65 and older have some hearing loss. By age 80, almost everyone does. Hearing loss often occurs slowly over the years. So you may not realize your hearing has gotten worse.  Have your hearing checked  Call your healthcare provider if you:  Have to strain to hear normal conversation  Have to watch other people s faces very carefully to follow what they re saying  Need to ask people to repeat what they ve said  Often misunderstand what people are saying  Turn the volume of the television or radio up so high that others complain  Feel that people are mumbling when they re talking to you  Find that the effort to hear leaves you feeling tired and irritated  Notice, when using the phone, that you hear better with one ear than the other  [place] last reviewed  this educational content on 1/1/2020 2000-2021 [employer]. All rights reserved. This information is not intended as a substitute for professional medical care. Always follow your healthcare professional's instructions.

## 2022-04-25 NOTE — PROGRESS NOTES
"SUBJECTIVE:   Abdulaziz Rendon is a 66 year old male who presents for Preventive Visit.    HPI-in addition to his annual wellness visit, Ketan is here to follow-up medical problems including hypertension, hypercholesterolemia, worsening hearing loss, and known ascending aortic dilatation.  See assessment and plan for details.    Patient has been advised of split billing requirements and indicates understanding: Yes  Are you in the first 12 months of your Medicare coverage?  No    Healthy Habits:     In general, how would you rate your overall health?  Good    Frequency of exercise:  4-5 days/week    Duration of exercise:  30-45 minutes    Do you usually eat at least 4 servings of fruit and vegetables a day, include whole grains    & fiber and avoid regularly eating high fat or \"junk\" foods?  Yes    Taking medications regularly:  Yes    Medication side effects:  None    Ability to successfully perform activities of daily living:  No assistance needed    Home Safety:  No safety concerns identified    Hearing Impairment:  Difficulty following a conversation in a noisy restaurant or crowded room and difficulty understanding soft or whispered speech    In the past 6 months, have you been bothered by leaking of urine?  No    In general, how would you rate your overall mental or emotional health?  Good      PHQ-2 Total Score: 0    Additional concerns today:  Yes    Do you feel safe in your environment? Yes    Have you ever done Advance Care Planning? (For example, a Health Directive, POLST, or a discussion with a medical provider or your loved ones about your wishes): No, advance care planning information given to patient to review.  Advanced care planning was discussed at today's visit.       Fall risk  Fallen 2 or more times in the past year?: No  Any fall with injury in the past year?: No    Cognitive Screening   1) Repeat 3 items (Leader, Season, Table)    2) Clock draw: NORMAL  3) 3 item recall: Recalls 2 objects " "  Results: NORMAL clock, 1-2 items recalled: COGNITIVE IMPAIRMENT LESS LIKELY    Mini-CogTM Copyright S Jacek. Licensed by the author for use in [facility]; reprinted with permission (jemima@[facility]). All rights reserved.      Do you have sleep apnea, excessive snoring or daytime drowsiness?: no    Reviewed and updated as needed this visit by clinical staff   Tobacco  Allergies  Meds  Problems  Med Hx  Surg Hx  Fam Hx            Reviewed and updated as needed this visit by Provider   Tobacco  Allergies  Meds  Problems  Med Hx  Surg Hx  Fam Hx           Social History     Tobacco Use     Smoking status: Never Smoker     Smokeless tobacco: Never Used   Substance Use Topics     Alcohol use: Yes     Comment: Alcoholic Drinks/day: weekend 2-3         Alcohol Use 4/25/2022   Prescreen: >3 drinks/day or >7 drinks/week? No               Current providers sharing in care for this patient include:   Patient Care Team:  Jc Rg MD as PCP - General  Jc Rg MD as Assigned PCP    The following health maintenance items are reviewed in Epic and correct as of today:  Health Maintenance Due   Topic Date Due     ZOSTER IMMUNIZATION (1 of 2) Never done     Lab work is in process          Review of Systems  12 point review of systems is negative other than what is discussed in the assessment and plan    OBJECTIVE:   /86 (BP Location: Right arm, Patient Position: Sitting, Cuff Size: Adult Regular)   Pulse 93   Ht 1.746 m (5' 8.75\")   Wt 94.3 kg (208 lb)   SpO2 97%   BMI 30.94 kg/m   Estimated body mass index is 30.94 kg/m  as calculated from the following:    Height as of this encounter: 1.746 m (5' 8.75\").    Weight as of this encounter: 94.3 kg (208 lb).  Physical Exam  EYES: Eyelids, conjunctiva, and sclera were normal. Pupils were normal. Cornea, iris, and lens were normal bilaterally.  HEAD, EARS, NOSE, MOUTH, AND THROAT: Head and face were normal. TMs and external " auditory canals are normal  NECK: Neck appearance was normal. There were no neck masses and the thyroid was not enlarged and no nodules are felt.  No lymphadenopathy.  RESPIRATORY: Breathing pattern was normal and the chest moved symmetrically.   Lung sounds were normal and there were no rales or wheezes.  CARDIOVASCULAR: Heart rate and rhythm were normal.  S1 and S2 were normal and there were no extra sounds or murmurs. Peripheral pulses in arms and legs were normal.  Jugular venous pressure was normal.  There was no peripheral edema.  No carotid bruits.  GASTROINTESTINAL:  Bowel sounds were present.   Palpation detected no tenderness, mass, or enlarged organs.   RECTAL/PROSTATE: No external lesions.  Sphincter tone normal.  No palpable rectal lesions.  Prostate moderately enlarged but smooth, nontender without nodules  MUSCULOSKELETAL: Skeletal configuration was normal and muscle mass was normal for age. Joint appearance was overall normal.  LYMPHATIC: There were no enlarged nodes.  SKIN/HAIR/NAILS: Skin color was normal.  Hair and nails were normal.There were no skin lesions.  NEUROLOGIC: The patient was alert and oriented to person, place, time, and circumstance. Speech was normal. Cranial nerves were normal. Motor strength was normal for age. The patient was normally coordinated.  Sensation intact.  PSYCHIATRIC:  Mood and affect were normal and the patient had normal recent and remote memory. The patient's judgment and insight were normal.        ASSESSMENT / PLAN:   1. Encounter for Medicare annual wellness exam  Immunizations are reviewed and recommending Shingrix vaccine.  Everything else is up-to-date.  He had his second COVID booster.  Living will discussed.  Information provided.  Non-smoker.  Uses alcohol in moderation.  Tries to exercise on a regular basis.  Overdue for screening colonoscopy and he will get this scheduled.  Phone number to schedule provided.  Prostate exam is normal and I will check a  PSA for prostate cancer screening.  Dementia and depression screening completed.  Recommending that he sees his ophthalmologist every year. Skin exam performed and recommending regular use of sunblock.  Hepatitis C antibody for screening was normal.  Will screen for diabetes with fasting glucose.      2. Ascending aorta dilatation (H)  Mildly dilated ascending thoracic aorta found on echocardiogram in 2019.  Asymptomatic.  Needs follow-up imaging.  We will decide if further is needed following CT coronary calcium score.  If significant changes suggested, proceed with CTA chest.  Good blood pressure control is needed with a goal of less than 130/80    3. Family history of premature coronary artery disease  Recommending CT coronary calcium score to help decide whether he would benefit from taking a statin.  Cholesterol has been mildly elevated.    4. Primary hypertension  Blood pressure remains in the high normal range as it usually is at home.  I would recommend increasing lisinopril to 40 mg daily.  - CBC with platelets; Future  - Comprehensive metabolic panel (BMP + Alb, Alk Phos, ALT, AST, Total. Bili, TP); Future  - UA Macro with Reflex to Micro and Culture - lab collect; Future  - lisinopril (ZESTRIL) 40 MG tablet; Take 1 tablet (40 mg) by mouth daily  Dispense: 90 tablet; Refill: 3  - CBC with platelets  - Comprehensive metabolic panel (BMP + Alb, Alk Phos, ALT, AST, Total. Bili, TP)  - UA Macro with Reflex to Micro and Culture - lab collect  - Urine Microscopic    5. Hypercholesteremia  As above, will check lipid profile and I would like him to get a CT coronary calcium score arranged  - Lipid Profile (Chol, Trig, HDL, LDL calc); Future  - Lipid Profile (Chol, Trig, HDL, LDL calc)    6. Bilateral hearing loss, unspecified hearing loss type  We discussed seeing an audiologist  - Adult Audiology Referral; Future    7. Acute hip pain, left  He has an appointment with orthopedics next week    8. Personal history  "of colonic polyps  Overdue for colonoscopy and he needs to get this scheduled    9. Screening for prostate cancer  Prostate exam is normal showing mildly enlarged but otherwise smooth prostate without nodules.  Will check PSA.  - PSA, screen; Future  - PSA, screen        COUNSELING:  Reviewed preventive health counseling, as reflected in patient instructions       Regular exercise       Healthy diet/nutrition       Vision screening       Hearing screening       Dental care       Colon cancer screening       Prostate cancer screening    Estimated body mass index is 30.94 kg/m  as calculated from the following:    Height as of this encounter: 1.746 m (5' 8.75\").    Weight as of this encounter: 94.3 kg (208 lb).        He reports that he has never smoked. He has never used smokeless tobacco.      Appropriate preventive services were discussed with this patient, including applicable screening as appropriate for cardiovascular disease, diabetes, osteopenia/osteoporosis, and glaucoma.  As appropriate for age/gender, discussed screening for colorectal cancer, prostate cancer, breast cancer, and cervical cancer. Checklist reviewing preventive services available has been given to the patient.    Reviewed patients plan of care and provided an AVS. The Basic Care Plan (routine screening as documented in Health Maintenance) for Abdulaziz meets the Care Plan requirement. This Care Plan has been established and reviewed with the Patient.    Counseling Resources:  ATP IV Guidelines  Pooled Cohorts Equation Calculator  Breast Cancer Risk Calculator  Breast Cancer: Medication to Reduce Risk  FRAX Risk Assessment  ICSI Preventive Guidelines  Dietary Guidelines for Americans, 2010  USDA's MyPlate  ASA Prophylaxis  Lung CA Screening    Jc Rg MD  [facility]    Identified Health Risks:    The patient was provided with written information regarding signs of hearing loss.  "

## 2022-05-02 ENCOUNTER — TRANSFERRED RECORDS (OUTPATIENT)
Dept: HEALTH INFORMATION MANAGEMENT | Facility: CLINIC | Age: 67
End: 2022-05-02
Payer: COMMERCIAL

## 2022-05-24 ENCOUNTER — OFFICE VISIT (OUTPATIENT)
Dept: INTERNAL MEDICINE | Facility: CLINIC | Age: 67
End: 2022-05-24
Payer: COMMERCIAL

## 2022-05-24 VITALS
DIASTOLIC BLOOD PRESSURE: 98 MMHG | SYSTOLIC BLOOD PRESSURE: 142 MMHG | BODY MASS INDEX: 31.33 KG/M2 | HEART RATE: 80 BPM | TEMPERATURE: 98.2 F | OXYGEN SATURATION: 97 % | WEIGHT: 210.6 LBS

## 2022-05-24 DIAGNOSIS — I77.810 ASCENDING AORTA DILATATION (H): ICD-10-CM

## 2022-05-24 DIAGNOSIS — Z01.818 PREOP GENERAL PHYSICAL EXAM: Primary | ICD-10-CM

## 2022-05-24 DIAGNOSIS — M16.12 PRIMARY OSTEOARTHRITIS OF LEFT HIP: ICD-10-CM

## 2022-05-24 DIAGNOSIS — I10 PRIMARY HYPERTENSION: ICD-10-CM

## 2022-05-24 LAB
ANION GAP SERPL CALCULATED.3IONS-SCNC: 6 MMOL/L (ref 5–18)
ATRIAL RATE - MUSE: 78 BPM
BUN SERPL-MCNC: 17 MG/DL (ref 8–22)
CALCIUM SERPL-MCNC: 9.5 MG/DL (ref 8.5–10.5)
CHLORIDE BLD-SCNC: 102 MMOL/L (ref 98–107)
CO2 SERPL-SCNC: 30 MMOL/L (ref 22–31)
CREAT SERPL-MCNC: 0.87 MG/DL (ref 0.7–1.3)
DIASTOLIC BLOOD PRESSURE - MUSE: NORMAL MMHG
ERYTHROCYTE [DISTWIDTH] IN BLOOD BY AUTOMATED COUNT: 12.3 % (ref 10–15)
GFR SERPL CREATININE-BSD FRML MDRD: >90 ML/MIN/1.73M2
GLUCOSE BLD-MCNC: 103 MG/DL (ref 70–125)
HCT VFR BLD AUTO: 45.1 % (ref 40–53)
HGB BLD-MCNC: 15.2 G/DL (ref 13.3–17.7)
INTERPRETATION ECG - MUSE: NORMAL
MCH RBC QN AUTO: 29.8 PG (ref 26.5–33)
MCHC RBC AUTO-ENTMCNC: 33.7 G/DL (ref 31.5–36.5)
MCV RBC AUTO: 88 FL (ref 78–100)
P AXIS - MUSE: 27 DEGREES
PLATELET # BLD AUTO: 214 10E3/UL (ref 150–450)
POTASSIUM BLD-SCNC: 4.7 MMOL/L (ref 3.5–5)
PR INTERVAL - MUSE: 150 MS
QRS DURATION - MUSE: 90 MS
QT - MUSE: 354 MS
QTC - MUSE: 403 MS
R AXIS - MUSE: -8 DEGREES
RBC # BLD AUTO: 5.1 10E6/UL (ref 4.4–5.9)
SODIUM SERPL-SCNC: 138 MMOL/L (ref 136–145)
SYSTOLIC BLOOD PRESSURE - MUSE: NORMAL MMHG
T AXIS - MUSE: 35 DEGREES
VENTRICULAR RATE- MUSE: 78 BPM
WBC # BLD AUTO: 5.6 10E3/UL (ref 4–11)

## 2022-05-24 PROCEDURE — 93005 ELECTROCARDIOGRAM TRACING: CPT | Performed by: INTERNAL MEDICINE

## 2022-05-24 PROCEDURE — 93010 ELECTROCARDIOGRAM REPORT: CPT | Performed by: INTERNAL MEDICINE

## 2022-05-24 PROCEDURE — 80048 BASIC METABOLIC PNL TOTAL CA: CPT | Performed by: INTERNAL MEDICINE

## 2022-05-24 PROCEDURE — 99214 OFFICE O/P EST MOD 30 MIN: CPT | Performed by: INTERNAL MEDICINE

## 2022-05-24 PROCEDURE — 36415 COLL VENOUS BLD VENIPUNCTURE: CPT | Performed by: INTERNAL MEDICINE

## 2022-05-24 PROCEDURE — 85027 COMPLETE CBC AUTOMATED: CPT | Performed by: INTERNAL MEDICINE

## 2022-05-24 RX ORDER — MUPIROCIN 20 MG/G
OINTMENT TOPICAL
COMMUNITY
Start: 2022-05-20 | End: 2022-09-12

## 2022-05-24 RX ORDER — OXYCODONE HYDROCHLORIDE 5 MG/1
TABLET ORAL
COMMUNITY
Start: 2022-05-20 | End: 2022-09-12

## 2022-05-24 RX ORDER — HYDROXYZINE HYDROCHLORIDE 10 MG/1
TABLET, FILM COATED ORAL
COMMUNITY
Start: 2022-05-20 | End: 2022-09-12

## 2022-05-24 RX ORDER — CEPHALEXIN 500 MG/1
CAPSULE ORAL
COMMUNITY
Start: 2022-05-20 | End: 2022-09-12

## 2022-05-24 RX ORDER — ONDANSETRON 4 MG/1
TABLET, ORALLY DISINTEGRATING ORAL
COMMUNITY
Start: 2022-05-20 | End: 2022-09-12

## 2022-05-24 NOTE — PATIENT INSTRUCTIONS
Preparing for Your Surgery  Getting started  A nurse will call you to review your health history and instructions. They will give you an arrival time based on your scheduled surgery time. Please be ready to share:  Your doctor's clinic name and phone number  Your medical, surgical and anesthesia history  A list of allergies and sensitivities  A list of medicines, including herbal treatments and over-the-counter drugs  Whether the patient has a legal guardian (ask how to send us the papers in advance)  Please tell us if you're pregnant--or if there's any chance you might be pregnant. Some surgeries may injure a fetus (unborn baby), so they require a pregnancy test. Surgeries that are safe for a fetus don't always need a test, and you can choose whether to have one.   If you have a child who's having surgery, please ask for a copy of Preparing for Your Child's Surgery.    Preparing for surgery  Within 30 days of surgery: Have a pre-op exam (sometimes called an H&P, or History and Physical). This can be done at a clinic or pre-operative center.  If you're having a , you may not need this exam. Talk to your care team.  At your pre-op exam, talk to your care team about all medicines you take. If you need to stop any medicines before surgery, ask when to start taking them again.  We do this for your safety. Many medicines can make you bleed too much during surgery. Some change how well surgery (anesthesia) drugs work.  Call your insurance company to let them know you're having surgery. (If you don't have insurance, call [phone].)  Call your clinic if there's any change in your health. This includes signs of a cold or flu (sore throat, runny nose, cough, rash, fever). It also includes a scrape or scratch near the surgery site.  If you have questions on the day of surgery, call your hospital or surgery center.  COVID testing  You may need to be tested for COVID-19 before having surgery. If so, we will give  you instructions.  Eating and drinking guidelines  For your safety: Unless your surgeon tells you otherwise, follow the guidelines below.  Eat and drink as usual until 8 hours before surgery. After that, no food or milk.  Drink clear liquids until 2 hours before surgery. These are liquids you can see through, like water, Gatorade and Propel Water. You may also have black coffee and tea (no cream or milk).  Nothing by mouth within 2 hours of surgery. This includes gum, candy and breath mints.  If you drink alcohol: Stop drinking it the night before surgery.  If your care team tells you to take medicine on the morning of surgery, it's okay to take it with a sip of water.  Preventing infection  Shower or bathe the night before and morning of your surgery. Follow the instructions your clinic gave you. (If no instructions, use regular soap.)  Don't shave or clip hair near your surgery site. We'll remove the hair if needed.  Don't smoke or vape the morning of surgery. You may chew nicotine gum up to 2 hours before surgery. A nicotine patch is okay.  Note: Some surgeries require you to completely quit smoking and nicotine. Check with your surgeon.  Your care team will make every effort to keep you safe from infection. We will:  Clean our hands often with soap and water (or an alcohol-based hand rub).  Clean the skin at your surgery site with a special soap that kills germs.  Give you a special gown to keep you warm. (Cold raises the risk of infection.)  Wear special hair covers, masks, gowns and gloves during surgery.  Give antibiotic medicine, if prescribed. Not all surgeries need antibiotics.  What to bring on the day of surgery  Photo ID and insurance card  Copy of your health care directive, if you have one  Glasses and hearing aides (bring cases)  You can't wear contacts during surgery  Inhaler and eye drops, if you use them (tell us about these when you arrive)  CPAP machine or breathing device, if you use them  A  few personal items, if spending the night  If you have . . .  A pacemaker, ICD (cardiac defibrillator) or other implant: Bring the ID card.  An implanted stimulator: Bring the remote control.  A legal guardian: Bring a copy of the certified (court-stamped) guardianship papers.  Please remove any jewelry, including body piercings. Leave jewelry and other valuables at home.  If you're going home the day of surgery  You must have a responsible adult drive you home. They should stay with you overnight as well.  If you don't have someone to stay with you, and you aren't safe to go home alone, we may keep you overnight. Insurance often won't pay for this.  After surgery  If it's hard to control your pain or you need more pain medicine, please call your surgeon's office.  Questions?   If you have any questions for your care team, list them here: _________________________________________________________________________________________________________________________________________________________________________ ____________________________________ ____________________________________ ____________________________________  For informational purposes only. Not to replace the advice of your health care provider. Copyright   2003, 2019 [employer] [facility]. All rights reserved. Clinically reviewed by Kae Mclaughlin MD. [employer] [id number] - REV 07/21.    How to Take Your Medication Before Surgery    Avoid aspirin and over-the-counter NSAIDs including Aleve, ibuprofen, Motrin, and Advil for 1 week prior to surgery    You may take Tylenol/acetaminophen during that week to control any pain symptoms    On the morning of surgery, take your usual dose of lisinopril with a small sip of water.  Hold all of your other medications until later in the day    Following surgery, you will need to take 81 mg of aspirin twice daily for 4 weeks to prevent any blood clots

## 2022-05-24 NOTE — PROGRESS NOTES
[facility]  [address] [address]  Phone: [phone]  Fax: [phone]  Primary Provider: Jc Rg  Pre-op Performing Provider: JC RG      PREOPERATIVE EVALUATION:  Today's date: 5/24/2022    Abdulaziz Rendon is a 66 year old male who presents for a preoperative evaluation.    Surgical Information:  Surgery/Procedure: Left Total Hip Arthroplasty  Surgery Location: [facility]  Surgeon: Dr. Vasques  Surgery Date: 6/6/22  Time of Surgery: Unknown  Where patient plans to recover: At home with family  Fax number for surgical facility: [phone]    Type of Anesthesia Anticipated: General    Assessment & Plan     The proposed surgical procedure is considered INTERMEDIATE risk.    Preop general physical exam  66-year-old male here for preop clearance prior to upcoming left total hip arthroplasty.  He has tolerated previous surgery and anesthesia without complications.  Overall risk is low and no contraindications to proceeding with surgery and anesthesia as planned.  He will avoid aspirin and OTC NSAIDs for 1 week prior to surgery.  He should also hold his multivitamin and flaxseed oil.  He will need DVT prophylaxis postoperatively with aspirin 81 mg twice daily for 4 weeks.  - EKG 12-lead, tracing only  - CBC with platelets; Future  - Basic metabolic panel  (Ca, Cl, CO2, Creat, Gluc, K, Na, BUN); Future  - CBC with platelets  - Basic metabolic panel  (Ca, Cl, CO2, Creat, Gluc, K, Na, BUN)    Primary osteoarthritis of left hip  Worsening left hip pain secondary to severe osteoarthritis with plans for left total hip arthroplasty    Primary hypertension  Blood pressure is not at goal today and some of this is situational.  He is also using NSAIDs to control his symptoms.  Recommending discontinuing.  No contraindication to proceeding with surgery.  On the morning of surgery he will take his usual dose of lisinopril 40 mg daily  with a small sip of water.  - CBC with platelets; Future  - Basic metabolic panel  (Ca, Cl, CO2, Creat, Gluc, K, Na, BUN); Future  - CBC with platelets  - Basic metabolic panel  (Ca, Cl, CO2, Creat, Gluc, K, Na, BUN)    Ascending aorta dilatation (H)  Mildly dilated ascending aorta on echocardiogram 2019.  Asymptomatic.               Medication Instructions:  Avoid aspirin and over-the-counter NSAIDs including Aleve, ibuprofen, Motrin, and Advil for 1 week prior to surgery    You may take Tylenol/acetaminophen during that week to control any pain symptoms    On the morning of surgery, take your usual dose of lisinopril with a small sip of water.  Hold all of your other medications until later in the day    RECOMMENDATION:  APPROVAL GIVEN to proceed with proposed procedure, without further diagnostic evaluation.    Review of the result(s) of each unique test - Ordered CBC, BMP and EKG and these were reviewed  Independent interpretation of a test performed by another physician/other qualified health care professional (not separately reported) -I personally reviewed and interpreted his EKG              Subjective     HPI related to upcoming procedure: 66-year-old male to have left total hip arthroplasty.    He has tolerated previous surgery and anesthesia without any complications.  There is no history of coronary artery disease or exertional chest pain.    Hypertension usually controlled with medication although mildly elevated today.  He has been using more NSAIDs to control his hip pain.    No personal or family history of DVT or pulmonary embolus.      Preop Questions 5/24/2022   1. Have you ever had a heart attack or stroke? No   2. Have you ever had surgery on your heart or blood vessels, such as a stent placement, a coronary artery bypass, or surgery on an artery in your head, neck, heart, or legs? No   3. Do you have chest pain with activity? No   4. Do you have a history of  heart failure? No   5. Do you  currently have a cold, bronchitis or symptoms of other infection? No   6. Do you have a cough, shortness of breath, or wheezing? No   7. Do you or anyone in your family have previous history of blood clots? No   8. Do you or does anyone in your family have a serious bleeding problem such as prolonged bleeding following surgeries or cuts? No   9. Have you ever had problems with anemia or been told to take iron pills? No   10. Have you had any abnormal blood loss such as black, tarry or bloody stools? No   11. Have you ever had a blood transfusion? No   12. Are you willing to have a blood transfusion if it is medically needed before, during, or after your surgery? Yes   13. Have you or any of your relatives ever had problems with anesthesia? No   14. Do you have sleep apnea, excessive snoring or daytime drowsiness? No   15. Do you have any artifical heart valves or other implanted medical devices like a pacemaker, defibrillator, or continuous glucose monitor? No   16. Do you have artificial joints? YES -bilateral knee replacements   17. Are you allergic to latex? No     Health Care Directive:  Patient does not have a Health Care Directive or Living Will:         Review of Systems  12 point review of systems is negative other than what is discussed in the assessment and plan    Patient Active Problem List    Diagnosis Date Noted     Primary osteoarthritis of left hip 05/24/2022     Priority: Medium     Acute hip pain, left 04/25/2022     Priority: Medium     Osteoarthritis of both knees      Priority: Medium     Bilateral TKA 2015         Personal history of colonic polyps      Priority: Medium     Colonoscopy October 2016 normal         HTN (hypertension)      Priority: Medium     Hypercholesteremia      Priority: Medium     Family history of premature coronary artery disease      Priority: Medium     Inguinal hernia      Priority: Medium     right         Hearing loss      Priority: Medium     left ear since  childhood          Past Medical History:   Diagnosis Date     Acute hip pain, left 4/25/2022     Ascending aorta dilatation (H) 11/05/2019    Found on echocardiogram April 2019.  Plan for CTA chest April 2020     Family history of premature coronary artery disease      Hearing loss     left ear since childhood     Herpes zoster 2014     HTN (hypertension)      Hypercholesteremia      Impaired fasting glucose     Fasting glucose 113 December 2018.  Check A1c at return     Inguinal hernia     right     Left-sided chest pain 11/05/2019    Normal nuclear stress test 2019     Osteoarthritis of both knees     Bilateral TKA 2015     Personal history of colonic polyps     Colonoscopy October 2016 normal     Primary osteoarthritis of left hip 5/24/2022     Recurrent shoulder dislocation      Past Surgical History:   Procedure Laterality Date     TOTAL KNEE ARTHROPLASTY Bilateral 01/2015     VASECTOMY       Current Outpatient Medications   Medication Sig Dispense Refill     ascorbic acid (ASCORBIC ACID WITH DION HIPS) 500 MG tablet [ASCORBIC ACID (ASCORBIC ACID WITH DION HIPS) 500 MG TABLET] Take 500 mg by mouth daily.       cholecalciferol, vitamin D3, (VITAMIN D3) 50 mcg (2,000 unit) capsule [CHOLECALCIFEROL, VITAMIN D3, (VITAMIN D3) 50 MCG (2,000 UNIT) CAPSULE] Take 1,000 Units by mouth daily.       co-enzyme Q-10 30 mg capsule [CO-ENZYME Q-10 30 MG CAPSULE] Take 30 mg by mouth 4 (four) times a week.              FLAXSEED ORAL [FLAXSEED ORAL] Take by mouth daily.              lisinopril (ZESTRIL) 40 MG tablet Take 1 tablet (40 mg) by mouth daily 90 tablet 3     multivitamin therapeutic (THERAGRAN) tablet [MULTIVITAMIN THERAPEUTIC (THERAGRAN) TABLET] Take 1 tablet by mouth daily.       ZINC ACETATE ORAL [ZINC ACETATE ORAL] Take 25 mg by mouth.       cephALEXin (KEFLEX) 500 MG capsule        hydrOXYzine (ATARAX) 10 MG tablet TAKE 1 TABLET BY MOUTH UP TO FOUR TIMES DAILY AS NEEDED (Patient not taking: Reported on 5/24/2022)        mupirocin (BACTROBAN) 2 % external ointment APPLY SWAB TOPICALLY IN EACH NOSTRIL TWICE DAILY FOR 5 DAYS. START 5 DAYS BEFORE SURGERY (Patient not taking: Reported on 5/24/2022)       ondansetron (ZOFRAN ODT) 4 MG ODT tab DISSOLVE 1 TABLET ON THE TONGUE FOUR TIMES DAILY AS NEEDED FOR NAUSEA (Patient not taking: Reported on 5/24/2022)       oxyCODONE (ROXICODONE) 5 MG tablet  (Patient not taking: Reported on 5/24/2022)         Allergies   Allergen Reactions     Hydrochlorothiazide Unknown     Insomnia, anxiety     Tapentadol Rash        Social History     Tobacco Use     Smoking status: Never Smoker     Smokeless tobacco: Never Used   Substance Use Topics     Alcohol use: Yes     Comment: Alcoholic Drinks/day: weekend 2-3     Family History   Problem Relation Age of Onset     Cancer Mother         d 2014 Breast Cancer multiple myeloma     Cancer Father         neck     Coronary Artery Disease Father      Hyperlipidemia Father      Bipolar Disorder Brother      History   Drug Use Unknown         Objective     BP (!) 142/98   Pulse 80   Temp 98.2  F (36.8  C) (Oral)   Wt 95.5 kg (210 lb 9.6 oz)   SpO2 97%   BMI 31.33 kg/m      Physical Exam  GENERAL APPEARANCE: healthy, alert and no distress  HENT: Normal  RESP: lungs clear to auscultation - no rales, rhonchi or wheezes  CV: regular rate and rhythm, normal S1 S2, no S3 or S4 and no murmur, click or rub   ABDOMEN: soft, nontender, no HSM or masses and bowel sounds normal  NEURO: Normal strength and tone, sensory exam grossly normal, mentation intact and speech normal      Diagnostics:  Labs-  Hemoglobin 15.2 platelet 214  Potassium 4.7 creatinine 0.87     EKG: Normal sinus rhythm with rate 78.  No significant ST or T wave abnormality.    Revised Cardiac Risk Index (RCRI):  The patient has the following serious cardiovascular risks for perioperative complications:   - No serious cardiac risks = 0 points     RCRI Interpretation: 0 points: Class I (very low risk -  0.4% complication rate)           Signed Electronically by: Jc Rg MD  Copy of this evaluation report is provided to requesting physician.

## 2022-06-06 ENCOUNTER — TRANSFERRED RECORDS (OUTPATIENT)
Dept: HEALTH INFORMATION MANAGEMENT | Facility: CLINIC | Age: 67
End: 2022-06-06
Payer: COMMERCIAL

## 2022-06-20 ENCOUNTER — TRANSFERRED RECORDS (OUTPATIENT)
Dept: HEALTH INFORMATION MANAGEMENT | Facility: CLINIC | Age: 67
End: 2022-06-20

## 2022-06-28 ENCOUNTER — TRANSFERRED RECORDS (OUTPATIENT)
Dept: HEALTH INFORMATION MANAGEMENT | Facility: CLINIC | Age: 67
End: 2022-06-28

## 2022-08-29 ENCOUNTER — TRANSFERRED RECORDS (OUTPATIENT)
Dept: HEALTH INFORMATION MANAGEMENT | Facility: CLINIC | Age: 67
End: 2022-08-29

## 2022-09-12 ENCOUNTER — OFFICE VISIT (OUTPATIENT)
Dept: INTERNAL MEDICINE | Facility: CLINIC | Age: 67
End: 2022-09-12
Payer: COMMERCIAL

## 2022-09-12 VITALS
WEIGHT: 215 LBS | BODY MASS INDEX: 31.98 KG/M2 | SYSTOLIC BLOOD PRESSURE: 152 MMHG | OXYGEN SATURATION: 99 % | HEART RATE: 73 BPM | DIASTOLIC BLOOD PRESSURE: 80 MMHG

## 2022-09-12 DIAGNOSIS — M25.512 CHRONIC LEFT SHOULDER PAIN: Primary | ICD-10-CM

## 2022-09-12 DIAGNOSIS — Z86.0100 PERSONAL HISTORY OF COLONIC POLYPS: ICD-10-CM

## 2022-09-12 DIAGNOSIS — G89.29 CHRONIC LEFT SHOULDER PAIN: Primary | ICD-10-CM

## 2022-09-12 DIAGNOSIS — I10 PRIMARY HYPERTENSION: ICD-10-CM

## 2022-09-12 PROCEDURE — 99214 OFFICE O/P EST MOD 30 MIN: CPT | Performed by: INTERNAL MEDICINE

## 2022-09-12 NOTE — PATIENT INSTRUCTIONS
Consider seeing orthopedic specialist regarding your shoulder if it becomes more bothersome.  Dr. Ed Gauthier at [facility] is excellent    I believe your current symptoms represent arthritis involving your acromioclavicular joint.  Alternatively this is a small benign cyst forming over the AC joint.    Bring your blood pressure monitor with you at physical in April

## 2022-09-12 NOTE — PROGRESS NOTES
Assessment & Plan     Chronic left shoulder pain  Limited range of motion left shoulder likely from osteoarthritis but has developed new lump near his acromioclavicular joint which represents either arthritis or a cyst.  Asymptomatic.  No intervention needed although if shoulder is giving him more trouble, I am suggesting he see an orthopedic specialist.  Physical therapy is a consideration.    Primary hypertension  Blood pressure is not at goal at today's visit despite taking lisinopril 40 mg daily.  However, he monitors blood pressure at home and highest reading is 133/85 and usually systolic is in the 120s and diastolic in the 70s.  There is probably a component of whitecoat hypertension but I am asking him to bring his home blood pressure monitor with him at his wellness visit next spring and continue to monitor at home.    Personal history of colonic polyps  His last colonoscopy was 2013 and was normal but previously had adenomatous polyps removed and should have a colonoscopy completed every 5 years.  I am sending that referral.  - Adult GI  Referral - Procedure Only; Future                 Return in about 7 months (around 4/12/2023) for Annual wellness visit.    Jc Rg MD  [facility]    Luis Cintron is a 66 year old, presenting for the following health issues: Lump on left shoulder recently found.  Also discussed blood pressure management and colon cancer screening.  See assessment plan for details.  Mass (Lump left shoulder X 1 week his daughter noticed it when she pat him on the shoulder)      Mass    History of Present Illness       Reason for visit:  Collar bone swelling as previously described  Symptom onset:  3-4 weeks ago  Symptoms include:  Swelling/nodule on collar bone toward shoulder end  Symptom intensity:  Mild  Symptom progression:  Staying the same  Had these symptoms before:  No  What makes it worse:  No  What makes it  better:  No    He eats 4 or more servings of fruits and vegetables daily.He consumes 1 sweetened beverage(s) daily.He exercises with enough effort to increase his heart rate 30 to 60 minutes per day.  He exercises with enough effort to increase his heart rate 5 days per week.   He is taking medications regularly.       Review of Systems   No severe shoulder pain.      Objective    BP (!) 152/80   Pulse 73   Wt 97.5 kg (215 lb)   SpO2 99%   BMI 31.98 kg/m    Body mass index is 31.98 kg/m .  Physical Exam   Limited range of motion left shoulder especially rotation  Arthritic changes versus cyst at left acromioclavicular joint

## 2022-10-01 ENCOUNTER — HEALTH MAINTENANCE LETTER (OUTPATIENT)
Age: 67
End: 2022-10-01

## 2022-12-06 ENCOUNTER — TRANSFERRED RECORDS (OUTPATIENT)
Dept: HEALTH INFORMATION MANAGEMENT | Facility: CLINIC | Age: 67
End: 2022-12-06

## 2023-01-17 PROBLEM — Z86.0100 PERSONAL HISTORY OF COLONIC POLYPS: Status: ACTIVE | Noted: 2023-01-17

## 2023-04-16 DIAGNOSIS — I10 PRIMARY HYPERTENSION: ICD-10-CM

## 2023-04-16 NOTE — TELEPHONE ENCOUNTER
"Routing refill request to provider for review/approval because:  BP out of range    Last Written Prescription Date:  4/25/2022  Last Fill Quantity: 90,  # refills: 3   Last office visit provider:  9/12/2022     Requested Prescriptions   Pending Prescriptions Disp Refills     lisinopril (ZESTRIL) 40 MG tablet [Pharmacy Med Name: LISINOPRIL 40MG TABLETS] 90 tablet 3     Sig: TAKE 1 TABLET(40 MG) BY MOUTH DAILY       ACE Inhibitors (Including Combos) Protocol Failed - 4/16/2023 11:01 AM        Failed - Blood pressure under 140/90 in past 12 months     BP Readings from Last 3 Encounters:   09/12/22 (!) 152/80   05/24/22 (!) 142/98   04/25/22 134/86                 Passed - Recent (12 mo) or future (30 days) visit within the authorizing provider's specialty     Patient has had an office visit with the authorizing provider or a provider within the authorizing providers department within the previous 12 mos or has a future within next 30 days. See \"Patient Info\" tab in inbasket, or \"Choose Columns\" in Meds & Orders section of the refill encounter.              Passed - Medication is active on med list        Passed - Patient is age 18 or older        Passed - Normal serum creatinine on file in past 12 months     Recent Labs   Lab Test 05/24/22  1158   CR 0.87       Ok to refill medication if creatinine is low          Passed - Normal serum potassium on file in past 12 months     Recent Labs   Lab Test 05/24/22  1158   POTASSIUM 4.7                  Kell Rangel RN 04/16/23 11:02 AM  "

## 2023-04-17 RX ORDER — LISINOPRIL 40 MG/1
TABLET ORAL
Qty: 90 TABLET | Refills: 3 | Status: SHIPPED | OUTPATIENT
Start: 2023-04-17 | End: 2024-04-10

## 2023-05-08 ENCOUNTER — OFFICE VISIT (OUTPATIENT)
Dept: INTERNAL MEDICINE | Facility: CLINIC | Age: 68
End: 2023-05-08
Payer: COMMERCIAL

## 2023-05-08 VITALS
DIASTOLIC BLOOD PRESSURE: 80 MMHG | BODY MASS INDEX: 31.99 KG/M2 | RESPIRATION RATE: 14 BRPM | SYSTOLIC BLOOD PRESSURE: 124 MMHG | OXYGEN SATURATION: 98 % | TEMPERATURE: 98 F | HEIGHT: 69 IN | WEIGHT: 216 LBS

## 2023-05-08 DIAGNOSIS — Z82.49 FAMILY HISTORY OF PREMATURE CORONARY ARTERY DISEASE: ICD-10-CM

## 2023-05-08 DIAGNOSIS — I10 PRIMARY HYPERTENSION: ICD-10-CM

## 2023-05-08 DIAGNOSIS — E78.00 HYPERCHOLESTEREMIA: ICD-10-CM

## 2023-05-08 DIAGNOSIS — Z00.00 ENCOUNTER FOR MEDICARE ANNUAL WELLNESS EXAM: Primary | ICD-10-CM

## 2023-05-08 DIAGNOSIS — H91.91 DECREASED HEARING OF RIGHT EAR: ICD-10-CM

## 2023-05-08 DIAGNOSIS — Z86.0100 PERSONAL HISTORY OF COLONIC POLYPS: ICD-10-CM

## 2023-05-08 DIAGNOSIS — Z12.5 SCREENING FOR PROSTATE CANCER: ICD-10-CM

## 2023-05-08 DIAGNOSIS — I77.810 ASCENDING AORTA DILATATION (H): ICD-10-CM

## 2023-05-08 DIAGNOSIS — Z96.653 HISTORY OF BILATERAL KNEE REPLACEMENT: ICD-10-CM

## 2023-05-08 DIAGNOSIS — Z96.642 HISTORY OF LEFT HIP REPLACEMENT: ICD-10-CM

## 2023-05-08 PROBLEM — M25.552 ACUTE HIP PAIN, LEFT: Status: RESOLVED | Noted: 2022-04-25 | Resolved: 2023-05-08

## 2023-05-08 PROBLEM — M16.12 PRIMARY OSTEOARTHRITIS OF LEFT HIP: Status: ACTIVE | Noted: 2022-05-24

## 2023-05-08 PROBLEM — M16.12 PRIMARY OSTEOARTHRITIS OF LEFT HIP: Status: RESOLVED | Noted: 2022-05-24 | Resolved: 2023-05-08

## 2023-05-08 LAB
ALBUMIN SERPL BCG-MCNC: 4.2 G/DL (ref 3.5–5.2)
ALBUMIN UR-MCNC: NEGATIVE MG/DL
ALP SERPL-CCNC: 84 U/L (ref 40–129)
ALT SERPL W P-5'-P-CCNC: 10 U/L (ref 10–50)
ANION GAP SERPL CALCULATED.3IONS-SCNC: 13 MMOL/L (ref 7–15)
APPEARANCE UR: CLEAR
AST SERPL W P-5'-P-CCNC: 28 U/L (ref 10–50)
BILIRUB SERPL-MCNC: 0.6 MG/DL
BILIRUB UR QL STRIP: NEGATIVE
BUN SERPL-MCNC: 13.9 MG/DL (ref 8–23)
CALCIUM SERPL-MCNC: 9.1 MG/DL (ref 8.8–10.2)
CHLORIDE SERPL-SCNC: 101 MMOL/L (ref 98–107)
CHOLEST SERPL-MCNC: 190 MG/DL
COLOR UR AUTO: YELLOW
CREAT SERPL-MCNC: 0.93 MG/DL (ref 0.67–1.17)
DEPRECATED HCO3 PLAS-SCNC: 23 MMOL/L (ref 22–29)
ERYTHROCYTE [DISTWIDTH] IN BLOOD BY AUTOMATED COUNT: 12.8 % (ref 10–15)
GFR SERPL CREATININE-BSD FRML MDRD: 89 ML/MIN/1.73M2
GLUCOSE SERPL-MCNC: 94 MG/DL (ref 70–99)
GLUCOSE UR STRIP-MCNC: NEGATIVE MG/DL
HCT VFR BLD AUTO: 45.4 % (ref 40–53)
HDLC SERPL-MCNC: 40 MG/DL
HGB BLD-MCNC: 15.1 G/DL (ref 13.3–17.7)
HGB UR QL STRIP: NEGATIVE
KETONES UR STRIP-MCNC: 15 MG/DL
LDLC SERPL CALC-MCNC: 127 MG/DL
LEUKOCYTE ESTERASE UR QL STRIP: NEGATIVE
MCH RBC QN AUTO: 29.5 PG (ref 26.5–33)
MCHC RBC AUTO-ENTMCNC: 33.3 G/DL (ref 31.5–36.5)
MCV RBC AUTO: 89 FL (ref 78–100)
NITRATE UR QL: NEGATIVE
NONHDLC SERPL-MCNC: 150 MG/DL
PH UR STRIP: 5.5 [PH] (ref 5–8)
PLATELET # BLD AUTO: 203 10E3/UL (ref 150–450)
POTASSIUM SERPL-SCNC: 4.3 MMOL/L (ref 3.4–5.3)
PROT SERPL-MCNC: 7.4 G/DL (ref 6.4–8.3)
PSA SERPL DL<=0.01 NG/ML-MCNC: 1.08 NG/ML (ref 0–4.5)
RBC # BLD AUTO: 5.11 10E6/UL (ref 4.4–5.9)
SODIUM SERPL-SCNC: 137 MMOL/L (ref 136–145)
SP GR UR STRIP: 1.01 (ref 1–1.03)
TRIGL SERPL-MCNC: 114 MG/DL
UROBILINOGEN UR STRIP-ACNC: 0.2 E.U./DL
WBC # BLD AUTO: 6.7 10E3/UL (ref 4–11)

## 2023-05-08 PROCEDURE — 36415 COLL VENOUS BLD VENIPUNCTURE: CPT | Performed by: INTERNAL MEDICINE

## 2023-05-08 PROCEDURE — 80061 LIPID PANEL: CPT | Performed by: INTERNAL MEDICINE

## 2023-05-08 PROCEDURE — 99214 OFFICE O/P EST MOD 30 MIN: CPT | Mod: 25 | Performed by: INTERNAL MEDICINE

## 2023-05-08 PROCEDURE — G0103 PSA SCREENING: HCPCS | Performed by: INTERNAL MEDICINE

## 2023-05-08 PROCEDURE — 80053 COMPREHEN METABOLIC PANEL: CPT | Performed by: INTERNAL MEDICINE

## 2023-05-08 PROCEDURE — 81003 URINALYSIS AUTO W/O SCOPE: CPT | Performed by: INTERNAL MEDICINE

## 2023-05-08 PROCEDURE — G0438 PPPS, INITIAL VISIT: HCPCS | Performed by: INTERNAL MEDICINE

## 2023-05-08 PROCEDURE — 85027 COMPLETE CBC AUTOMATED: CPT | Performed by: INTERNAL MEDICINE

## 2023-05-08 RX ORDER — MAGNESIUM OXIDE 400 MG/1
TABLET ORAL EVERY 12 HOURS
COMMUNITY
Start: 2022-12-06

## 2023-05-08 RX ORDER — UBIDECARENONE 30 MG
CAPSULE ORAL
COMMUNITY
Start: 2022-12-06

## 2023-05-08 ASSESSMENT — ENCOUNTER SYMPTOMS
DIARRHEA: 0
MYALGIAS: 0
PARESTHESIAS: 0
PALPITATIONS: 0
HEARTBURN: 0
WEAKNESS: 0
ARTHRALGIAS: 0
CHILLS: 0
JOINT SWELLING: 0
NERVOUS/ANXIOUS: 0
SORE THROAT: 0
HEMATOCHEZIA: 0
SHORTNESS OF BREATH: 0
HEMATURIA: 0
FREQUENCY: 0
DIZZINESS: 0
NAUSEA: 0
ABDOMINAL PAIN: 0
CONSTIPATION: 0
FEVER: 0
COUGH: 0
DYSURIA: 0
EYE PAIN: 0
HEADACHES: 0

## 2023-05-08 ASSESSMENT — ACTIVITIES OF DAILY LIVING (ADL): CURRENT_FUNCTION: NO ASSISTANCE NEEDED

## 2023-05-08 NOTE — PATIENT INSTRUCTIONS
Annual flu shot every fall    Recommending COVID booster this spring    I would recommend getting a shingles vaccine, Shingrix.  This can be obtained at your pharmacy as well.  After receiving the first injection, you will need to return in 2 to 6 months for the second    Colonoscopy should be repeated in December 2027    Continue to see an eye doctor annually    I would recommend getting a CT coronary calcium score completed.  This can be done at [facility].  [phone] to schedule.  It may not be covered by insurance but should not cost more than $99 out-of-pocket.      Patient Education   Personalized Prevention Plan  You are due for the preventive services outlined below.  Your care team is available to assist you in scheduling these services.  If you have already completed any of these items, please share that information with your care team to update in your medical record.  Health Maintenance Due   Topic Date Due    Zoster (Shingles) Vaccine (1 of 2) Never done       Signs of Hearing Loss  Hearing loss is a problem shared by many people. In fact, it's one of the most common health problems, particularly as people age. Most people aged 65 and older have some hearing loss. By age 80, almost everyone does. Hearing loss often occurs slowly over the years. So, you may not realize your hearing has gotten worse.   When sudden hearing loss occurs, it's important to contact your healthcare provider right away. Your provider will do a medical exam and a hearing exam as soon as possible. This is to help find the cause and type of your sudden hearing loss. Based on your diagnosis, your healthcare provider will discuss possible treatments.      Hearing much better with one ear can be a sign of hearing loss.     Have your hearing checked  Call your healthcare provider if you:   Have to strain to hear normal conversation  Have to watch other people s faces very carefully to follow what they re saying  Need to ask  people to repeat what they ve said  Often misunderstand what people are saying  Turn the volume of the television or radio up so high that others complain  Feel that people are mumbling when they re talking to you  Find that the effort to hear leaves you feeling tired and irritated  Notice, when using the phone, that you hear better with one ear than the other  [employer] last reviewed this educational content on 6/1/2022 2000-2022 [employer]. All rights reserved. This information is not intended as a substitute for professional medical care. Always follow your healthcare professional's instructions.

## 2023-05-08 NOTE — PROGRESS NOTES
"SUBJECTIVE:   Abdulaziz is a 67 year old who presents for Preventive Visit.    67-year-old male here for annual wellness visit and to follow-up medical problems which include hypertension, osteoarthritis, family history of heart disease and hearing loss.  See assessment and plan for details.          5/8/2023     4:10 PM   Additional Questions   Roomed by    Patient has been advised of split billing requirements and indicates understanding: Yes  Are you in the first 12 months of your Medicare coverage?  No    Healthy Habits:     In general, how would you rate your overall health?  Good    Frequency of exercise:  4-5 days/week    Duration of exercise:  30-45 minutes    Do you usually eat at least 4 servings of fruit and vegetables a day, include whole grains    & fiber and avoid regularly eating high fat or \"junk\" foods?  Yes    Taking medications regularly:  Yes    Medication side effects:  None    Ability to successfully perform activities of daily living:  No assistance needed    Home Safety:  Throw rugs in the hallway and lack of grab bars in the bathroom    Hearing Impairment:  Need to ask people to speak up or repeat themselves and difficulty understanding soft or whispered speech    In the past 6 months, have you been bothered by leaking of urine?  No    In general, how would you rate your overall mental or emotional health?  Good      PHQ-2 Total Score: 0    Additional concerns today:  No      Have you ever done Advance Care Planning? (For example, a Health Directive, POLST, or a discussion with a medical provider or your loved ones about your wishes): No, advance care planning information given to patient to review.  Advanced care planning was discussed at today's visit.       Fall risk  Fallen 2 or more times in the past year?: No  Any fall with injury in the past year?: No    Cognitive Screening   1) Repeat 3 items (Leader, Season, Table)    2) Clock draw: NORMAL  3) 3 item recall: Recalls 3 " objects  Results: 3 items recalled: COGNITIVE IMPAIRMENT LESS LIKELY    Mini-CogTM Copyright KRYSTLE Read. Licensed by the author for use in [facility]; reprinted with permission (jemima@.[facility]). All rights reserved.      Do you have sleep apnea, excessive snoring or daytime drowsiness?: no    Reviewed and updated as needed this visit by clinical staff   Tobacco  Allergies  Meds              Reviewed and updated as needed this visit by Provider                 Social History     Tobacco Use     Smoking status: Never     Smokeless tobacco: Never   Vaping Use     Vaping status: Not on file   Substance Use Topics     Alcohol use: Yes     Comment: Alcoholic Drinks/day: weekend 2-3             5/8/2023     3:24 PM   Alcohol Use   Prescreen: >3 drinks/day or >7 drinks/week? No     Do you have a current opioid prescription? No  Do you use any other controlled substances or medications that are not prescribed by a provider? Alcohol              Current providers sharing in care for this patient include:   Patient Care Team:  Jc Rg MD as PCP - General  Jc Rg MD as Assigned PCP    The following health maintenance items are reviewed in Epic and correct as of today:  Health Maintenance   Topic Date Due     ZOSTER IMMUNIZATION (1 of 2) Never done     ANNUAL REVIEW OF HM ORDERS  04/25/2023     MEDICARE ANNUAL WELLNESS VISIT  04/25/2023     FALL RISK ASSESSMENT  05/08/2024     DTAP/TDAP/TD IMMUNIZATION (2 - Td or Tdap) 09/09/2024     LIPID  04/25/2027     ADVANCE CARE PLANNING  04/25/2027     COLORECTAL CANCER SCREENING  12/06/2032     HEPATITIS C SCREENING  Completed     PHQ-2 (once per calendar year)  Completed     INFLUENZA VACCINE  Completed     Pneumococcal Vaccine: 65+ Years  Completed     AORTIC ANEURYSM SCREENING (SYSTEM ASSIGNED)  Completed     COVID-19 Vaccine  Completed     IPV IMMUNIZATION  Aged Out     MENINGITIS IMMUNIZATION  Aged Out     Lab work is in process          Review  "of Systems   Constitutional: Negative for chills and fever.   HENT: Positive for hearing loss. Negative for congestion, ear pain and sore throat.    Eyes: Negative for pain and visual disturbance.   Respiratory: Negative for cough and shortness of breath.    Cardiovascular: Negative for chest pain, palpitations and peripheral edema.   Gastrointestinal: Negative for abdominal pain, constipation, diarrhea, heartburn, hematochezia and nausea.   Genitourinary: Negative for dysuria, frequency, genital sores, hematuria, impotence, penile discharge and urgency.   Musculoskeletal: Negative for arthralgias, joint swelling and myalgias.   Skin: Negative for rash.   Neurological: Negative for dizziness, weakness, headaches and paresthesias.   Psychiatric/Behavioral: Negative for mood changes. The patient is not nervous/anxious.          OBJECTIVE:   /80 (BP Location: Right arm, Patient Position: Sitting, Cuff Size: Adult Regular)   Temp 98  F (36.7  C) (Oral)   Resp 14   Ht 1.753 m (5' 9\")   Wt 98 kg (216 lb)   SpO2 98%   BMI 31.90 kg/m   Estimated body mass index is 31.9 kg/m  as calculated from the following:    Height as of this encounter: 1.753 m (5' 9\").    Weight as of this encounter: 98 kg (216 lb).  Physical Exam  EYES: Eyelids, conjunctiva, and sclera were normal. Pupils were normal. Cornea, iris, and lens were normal bilaterally.  HEAD, EARS, NOSE, MOUTH, AND THROAT: Head and face were normal. TMs and external auditory canals are normal  NECK: Neck appearance was normal. There were no neck masses and the thyroid was not enlarged and no nodules are felt.  No lymphadenopathy.  RESPIRATORY: Breathing pattern was normal and the chest moved symmetrically.   Lung sounds were normal and there were no rales or wheezes.  CARDIOVASCULAR: Heart rate and rhythm were normal.  S1 and S2 were normal and there were no extra sounds or murmurs. Peripheral pulses in arms and legs were normal.  There was no peripheral edema. "  No carotid bruits.  GASTROINTESTINAL:  Bowel sounds were present.   Palpation detected no tenderness, mass, or enlarged organs.   RECTAL/PROSTATE: Deferred  MUSCULOSKELETAL: Skeletal configuration was normal and muscle mass was normal for age. Joint appearance was overall normal.  LYMPHATIC: There were no enlarged nodes.  SKIN/HAIR/NAILS: Skin color was normal.  There were no concerning skin lesions.  NEUROLOGIC: The patient was alert and oriented to person, place, time, and circumstance. Speech was normal. Cranial nerves were normal. Motor strength was normal for age. The patient was normally coordinated.  Sensation intact.  PSYCHIATRIC:  Mood and affect were normal and the patient had normal recent and remote memory. The patient's judgment and insight were normal.        ASSESSMENT / PLAN:   1. Encounter for Medicare annual wellness exam  Immunizations are reviewed and recommending COVID booster and getting Shingrix completed.  Continue annual flu shot.  Living will discussed.  Non-smoker.  Uses alcohol in moderation.  Regular exercise and good diet habits to maintain a healthy weight discussed.  Up to date with colonoscopies and this should be repeated in December 2027.  Prostate exam is deferred but I will check a PSA for prostate cancer screening.  Dementia and depression screening completed.  He is getting an annual eye exam completed.  Seeing a dentist every 6 months recommended.  Skin exam performed and recommending regular use of sunblock.  Hepatitis C antibody for screening was normal.  Will screen for diabetes with fasting glucose.     2. Primary hypertension  Blood pressure is well controlled taking lisinopril 40 mg daily.  Good control at home as well.  Tolerating medication without side effect.  - CBC with platelets; Future  - Comprehensive metabolic panel (BMP + Alb, Alk Phos, ALT, AST, Total. Bili, TP); Future  - UA Macroscopic with reflex to Microscopic and Culture; Future    3. Ascending aorta  "dilatation (H)  Found to have 4.1 cm dilated ascending aorta on echocardiogram in 2019 and should have follow-up imaging.  This can be accomplished if he has a CT coronary calcium score performed as planned.  If significant change is noted as an incidental finding on the scan, CTA chest would be recommended.    4. Hypercholesteremia with family history of premature coronary artery disease  Cholesterol has been elevated in the past although much better controlled last year.  Nevertheless, his 10-year ASCVD risk is 20.2%.  We discussed getting a CT coronary calcium score completed especially with his family history of heart disease.  Beginning a statin is a consideration even if LDL is under 130.  He denies any exertional chest pain.  - Lipid Profile (Chol, Trig, HDL, LDL calc); Future      The 10-year ASCVD risk score (Bay RODRIGUEZ, et al., 2019) is: 20.2%    Values used to calculate the score:      Age: 67 years      Sex: Male      Is Non- : No      Diabetic: No      Tobacco smoker: No      Systolic Blood Pressure: 124 mmHg      Is BP treated: Yes      HDL Cholesterol: 36 mg/dL      Total Cholesterol: 178 mg/dL        6. History of bilateral knee replacement  He has had both knees replaced    7. History of left hip replacement  Successful left hip replacement in 2022    8. Decreased hearing of right ear  Chronic hearing loss right ear.  Will send to audiology.  - Adult Audiology  Referral; Future    9. Personal history of colonic polyps  Colonoscopy with adenomatous polyp in December 2022.  Recommending repeating in 5 years    10. Screening for prostate cancer  Exam deferred as PSA has been historically very low but will continue annual prostate cancer screening with PSA  - PSA, screen; Future              BMI:   Estimated body mass index is 31.9 kg/m  as calculated from the following:    Height as of this encounter: 1.753 m (5' 9\").    Weight as of this encounter: 98 kg (216 lb). "         He reports that he has never smoked. He has never used smokeless tobacco.      Appropriate preventive services were discussed with this patient, including applicable screening as appropriate for cardiovascular disease, diabetes, osteopenia/osteoporosis, and glaucoma.  As appropriate for age/gender, discussed screening for colorectal cancer, prostate cancer, breast cancer, and cervical cancer. Checklist reviewing preventive services available has been given to the patient.    Reviewed patients plan of care and provided an AVS. The Basic Care Plan (routine screening as documented in Health Maintenance) for Abdulaziz meets the Care Plan requirement. This Care Plan has been established and reviewed with the Patient.      Jc Rg MD  [facility]    Identified Health Risks:    I have reviewed Opioid Use Disorder and Substance Use Disorder risk factors and made any needed referrals.       The patient was provided with written information regarding signs of hearing loss.

## 2023-09-08 NOTE — TELEPHONE ENCOUNTER
[facility]iology Progress Note     Loi Meza Patient Status:  Inpatient    1960 MRN [id number]   Location [facility] INTENSIVE CARE UNIT Attending Petros Selby MD   Hosp Day # 6 PCP Pcp, No     Subjective:      Loi Meza is a 62 year old male patient on Hospital Day: 7 of admission.    ***    Telemetry personally reviewed showed : ***    Review of Systems: ***  General: No fever or chills, No loss of appetite.    RS: No hemoptysis  GI: No melena or hematochezia  : No urinary disturbance  Derm: No skin disorders   Heme: No blood dyscrasias.  Remainder of 12 point systems reviewed and negative (or as mentioned in HPI)    Allergies:   ALLERGIES:  Not on File   Medications:  Current Facility-Administered Medications   Medication Dose Route Frequency Provider Last Rate Last Admin   • potassium phosphate/sodium phosphate (K PHOS NEUTRAL) tablet 250 mg  1 tablet Oral Once Petros Selby MD       • sodium chloride (PF) 0.9 % injection 2 mL  2 mL Intracatheter 2 times per day Meaghan Vickers MD       • [START ON 2023] aspirin chewable 81 mg  81 mg Oral Daily Meaghan Vickers MD       • insulin lispro (ADMELOG,HumaLOG) - Correction Dose   Subcutaneous TID WC Petros Selby MD       • insulin lispro (ADMELOG,HumaLOG) - Correction Dose   Subcutaneous Nightly Petros Selby MD       • furosemide (LASIX INJECT) injection 20 mg  20 mg Intravenous BID Meaghan Vickers MD   20 mg at 23 1817   • midodrine (PROAMATINE) tablet 5 mg  5 mg Oral 3 times per day Yoana Marinelli CNP   5 mg at 23 0556   • Potassium Standard Replacement Protocol (Levels 3.5 and lower)   Does not apply See Admin Instructions Ethel Lemus MD       • Magnesium Standard Replacement Protocol   Does not apply See Admin Instructions Ethel Lemus MD       • Phosphorus Standard Replacement Protocol   Does not apply See Admin Instructions Ethel Lemus MD       • piperacillin-tazobactam (ZOSYN)  Left detailed message with below information from PCP. I asked that patient call back sooner rather then later to schedule the physical.    Ekaterina Torres, CMA     3.375 g in sodium chloride 0.9 % 100 mL IVPB  3.375 g Intravenous 3 times per day Abdoulaye Boyd, CNP 25 mL/hr at 09/08/23 0559 Rate Verify at 09/08/23 0559   • sodium chloride (PF) 0.9 % injection 2 mL  2 mL Intracatheter 2 times per day Lety Harmon MD   2 mL at 09/07/23 2107      Current Facility-Administered Medications   Medication Dose Route Frequency Provider Last Rate Last Admin   • heparin (porcine) 25,000 units/250 mL in dextrose 5 % infusion  1-40 Units/kg/hr (Order-Specific) Intravenous Continuous Meaghan Vickers MD 9.9 mL/hr at 09/08/23 0559 12 Units/kg/hr at 09/08/23 0559   • sodium chloride 0.9% infusion   Intravenous Continuous PRN Lety Harmon MD       • sodium chloride 0.9% infusion   Intravenous Continuous PRN Lety Harmon MD 10 mL/hr at 09/05/23 1320 New Bag at 09/05/23 1320      Objective:     Vital Last Value 24 Hour Range   Temperature 96.1 °F (35.6 °C) (09/08/23 0400) Temp  Min: 96.1 °F (35.6 °C)  Max: 97.7 °F (36.5 °C)   Pulse 95 (09/08/23 0400) Pulse  Min: 85  Max: 102   Respiratory 15 (09/08/23 0400) Resp  Min: 12  Max: 25   Non-Invasive  Blood Pressure 108/68 (09/08/23 0400) BP  Min: 101/61  Max: 112/72   Pulse Oximetry 96 % (09/08/23 0400) SpO2  Min: 96 %  Max: 98 %   Arterial   Blood Pressure 110/46 (09/05/23 1600) No data recorded     Intake/Output:     Intake/Output Summary (Last 24 hours) at 9/8/2023 1058  Last data filed at 9/8/2023 0700  Gross per 24 hour   Intake 818.03 ml   Output 1100 ml   Net -281.97 ml     Weight    09/05/23 0300 09/06/23 0300 09/07/23 0100 09/08/23 0442   Weight: 76.4 kg (168 lb 6.9 oz) 76 kg (167 lb 8.8 oz) 77.4 kg (170 lb 10.2 oz) 75.9 kg (167 lb 5.3 oz)        GENERAL: No apparent distress  HEENT: Normocephalic.  Neck:  Supple neck.   Oral mucosa : Pink and moist.    Endocrine: There is no goiter.  CVS: Regular rate and rhythm.  Normal first and second heart tones.   Lung fields: Clear to auscultation bilaterally.   GI: Soft. Nontender,  nondistended.    Lower extremity: No cyanosis, clubbing or edema.   Peripheral vascular: Both lower extremities are warm and well perfused.    Neuro: Awake and alert.  Nonfocal examination.  Psych: Appropriate mood and affect  Integumentary: Warm and Dry  Laboratory/Data:   (PERSONALLY REVIEWED)    Labs    CBC  Recent Labs   Lab 09/08/23 0348 09/07/23  0420 09/06/23  0546   WBC 10.9 10.2 8.5   HCT 32.8* 32.1* 30.0*   HGB 10.6* 10.3* 9.7*    252 240       CMP  Recent Labs   Lab 09/08/23  0348 09/07/23  1629 09/07/23  0420 09/06/23  1129 09/06/23  0546   SODIUM 141 139 144  --  139   POTASSIUM 3.3* 3.9 3.0*   < > 2.7*   CHLORIDE 106 108 109  --  105   CO2 28 24 27  --  30   GLUCOSE 117* 106* 101*  --  92   BUN 26* 30* 30*  --  35*   CREATININE 0.69 0.76 0.75  --  0.95   CALCIUM 7.9* 7.7* 7.6*  --  7.5*   TOTPROTEIN 5.0*  --  4.8*  --  4.4*   ALBUMIN 1.2*  --  1.2*  --  1.1*   BILIRUBIN 1.7*  --  1.8*  --  1.6*   AST 32  --  47*  --  60*   GPT 39  --  48  --  48   ALKPT 124*  --  120*  --  117    < > = values in this interval not displayed.       Cardiac Labs  Recent Labs   Lab 09/04/23  0536 09/02/23  1226   TSH 4.333  --    NTPROB  --  37,182*       Lipid Panel  Recent Labs   Lab 09/04/23  0536   CHOLESTEROL 110   HDL 23*   CALCLDL 71   TRIGLYCERIDE 78       Coags  Recent Labs   Lab 09/08/23  0348 09/07/23  0420 09/06/23  2226 09/06/23  0546 09/05/23  0550 09/02/23 2015 09/02/23  1226   INR  --   --   --   --  1.4  --  1.4   PTT 53* 59* 57*   < > 65*   < > 29    < > = values in this interval not displayed.       Imaging    ECG:   Encounter Date: 09/02/23   Electrocardiogram 12-Lead   Result Value    Ventricular Rate EKG/Min (BPM) 138    Atrial Rate (BPM) 138    IL-Interval (MSEC) 150    QRS-Interval (MSEC) 84    QT-Interval (MSEC) 326    QTc 494    P Axis (Degrees) 13    R Axis (Degrees) -5    T Axis (Degrees) 152    REPORT TEXT      Sinus tachycardia  Septal infarct  , age undetermined  ST And  T wave  abnormality, consider lateral ischemia  Abnormal ECG  No previous ECGs available  Confirmed by NGOC GARCIA, CHAVO ([id number]) on 9/3/2023 12:01:20 AM          Echocardiogram:  Results for orders placed during the hospital encounter of 23    TRANSTHORACIC ECHO (TTE) COMPLETE W/ W/O IMAGING AGENT    Narrative  *[facility]*  [address] [place] [address]  Transthoracic Echocardiogram (TTE)    Patient: Loi Meza      Study Date/Time:      Sep 2 2023 3:49PM  MRN:     [id number]               FIN#:                 [id number]  :     1960             Ht/Wt:                167.6cm 82kg  Age:     62                     BSA/BMI:              1.98m^2 29.2kg/m^2  Gender:  M                      Baseline BP:          91 / 71  Ordering Physician:    Lety Harmon    Referring Physician:   Lety Harmon    Attending Physician:   Abdoulaye Queen  Performing Physician:  [facility]  Diagnostic Physician:  Alexandra Hernandez  Sonographer:           SIMONE Velazquez    ------------------------------------------------------------------------------  INDICATIONS:  Acute hypoxemia respiratory failure. STAT    ------------------------------------------------------------------------------  STUDY CONCLUSIONS  SUMMARY:    1. Left ventricle: The cavity size is dilated. Wall thickness is normal.  Systolic function is severely reduced. The ejection fraction was measured  by biplane method of disks. The ejection fraction is 16%.  2. Aortic valve: There is mild regurgitation.  3. Mitral valve: There is moderate-severe regurgitation.  4. Left atrium: The atrium is dilated.  5. Right ventricle: The cavity size is dilated. Wall thickness is normal.  Systolic function is severely reduced Systolic pressure is mildly to  moderately increased. The estimated peak pressure is 39mm Hg.  6. At least moderate left pleural effusion.  7. Apical aneurysm can't be completely ruled  out    ------------------------------------------------------------------------------  STUDY DATA:   Procedure:  A transthoracic echocardiogram was performed. Image  quality was good. Intravenous contrast (Definity 2ml) was administered to  opacify the left ventricle.  M-mode, complete 2D, complete spectral Doppler,  and color Doppler.  Study status:  STAT.  Study completion:  There were no  complications.    FINDINGS    LEFT VENTRICLE:  The cavity size is dilated. Wall thickness is normal. There  is no evidence of hypertrophy. Systolic function is severely reduced. There is  severe diffuse hypokinesis. Unable to accurately assess left ventricular  diastolic function parameters due to technical limitations.  Cannot exclude a  thrombus .    The ejection fraction was measured by biplane method of disks.  The ejection fraction is 16%. Cannot assess LV diastolic function.    AORTIC VALVE:  The annulus is mildly calcified. The valve is trileaflet. The  leaflets are normal thickness. Cusp separation is normal. Mobility is not  restricted. Velocity is within the normal range. There is no stenosis. There  is mild regurgitation.    MITRAL VALVE:  The annulus is normal. The leaflets are mildly thickened.  Leaflet separation is normal. Mobility is not restricted. Inflow velocity is  within the normal range. There is no evidence for stenosis. There is  moderate-severe regurgitation. The peak diastolic gradient is 6mm Hg.    LEFT ATRIUM:  The atrium is dilated.    RIGHT VENTRICLE:  The cavity size is dilated. Wall thickness is normal.  Systolic function is severely reduced  Systolic pressure is mildly to  moderately increased. The estimated peak pressure is 39mm Hg.       The RV  pressure during systole is 8mm Hg.    PULMONIC VALVE:   Not well visualized. There is no significant regurgitation.    TRICUSPID VALVE:  The valve is structurally normal. Leaflet separation is  normal. Inflow velocity is within the normal range. There  is no evidence for  stenosis. There is mild-moderate regurgitation.    RIGHT ATRIUM:  The atrium is dilated.    PERICARDIUM:  Pleural effusion present.    ------------------------------------------------------------------------------  Measurements    Left ventricle         Value        Ref        Left atrium continued     Value           Ref  WENDY, LAX chord     (H) 6.2   cm     4.2 - 5.8  Area ES, A4C          (N) 17     cm^2     <=20  ESD, LAX chord     (H) 5.3   cm     2.5 - 4.0  Area/bsa ES, A4C          8.75   cm^2/m^2 ---------  WENDY/bsa, LAX chord (H) 3.1   cm/m^2 2.2 - 3.0  Vol, S                (N) 54     ml       18 - 58  ESD/bsa, LAX chord (H) 2.7   cm/m^2 1.3 - 2.1  Vol/bsa, S            (N) 27     ml/m^2   16 - 34  PW, ED, LAX        (N) 0.9   cm     0.6 - 1.0  Vol, ES, 1-p A4C      (N) 52     ml       18 - 58  IVS, ED            (N) 1.0   cm     0.6 - 1.0  Vol/bsa, ES, 1-p A4C  (N) 26     ml/m^2   12 - 37  PW, ED             (N) 0.9   cm     0.6 - 1.0  Vol, ES, 1-p A2C      (N) 52     ml       18 - 58  IVS/PW, ED             1.11         ---------  Vol/bsa, ES, 1-p A2C  (N) 26     ml/m^2   11 - 43  EDV                (H) 193   ml     62 - 150  ESV                (H) 136   ml     21 - 61    Aortic valve              Value           Ref  EF                 (L) 16    %      52 - 72    AR peak v                 2.95   m/s      ---------  SV                     58    ml     ---------  AR decel                  320.07 cm/s^2   ---------  EDV/bsa            (H) 98    ml/m^2 34 - 74    AR decel time             929    ms       ---------  ESV/bsa            (H) 69    ml/m^2 11 - 31    AR PHT                    269    ms       ---------  SV/bsa                 29    ml/m^2 ---------  AR peak grad              35     mm Hg    ---------  ESV, 1-p A4C       (H) 183   ml     22 - 78  SV, 1-p A4C            35    ml     ---------  Mitral valve              Value           Ref  ESV/bsa, 1-p A4C   (H) 93    ml/m^2 12 -  40    Peak grad, D              6      mm Hg    ---------  SV/bsa, 1-p A4C        17    ml/m^2 ---------  MR peak v                 3.79   m/sec    ---------  EDV, 2-p           (H) 239   ml     62 - 150   Peak LV-LA grad S         58     mm Hg    ---------  ESV, 2-p           (H) 200   ml     21 - 61  EDV/bsa, 2-p       (H) 121   ml/m^2 34 - 74    Tricuspid valve           Value           Ref  ESV/bsa, 2-p       (H) 101   ml/m^2 11 - 31    TR peak v             (N) 2.5    m/sec    <=2.8  Peak RV-RA grad, S        24     mm Hg    ---------  Right ventricle        Value        Ref  WENDY, LAX               2.9   cm     ---------  Aortic root               Value           Ref  TAPSE, MM          (L) 1.4   cm     >=1.7      Root diam             (N) 2.7    cm       2.7 - 4.1  Pressure, S            8     mm Hg  ---------  Root diam/bsa             1.4    cm/m^2   ---------  Root diam, ED         (N) 2.7    cm       2.7 - 4.1  Left atrium            Value        Ref  AP dim, ES         (N) 3.7   cm     3.0 - 4.0  Pulmonary artery          Value           Ref  AP dim index, ES   (N) 1.9   cm/m^2 1.5 - 2.3  Pressure, S               8      mm Hg    ---------  Legend:  (L)  and  (H)  rosalina values outside specified reference range.    (N)  marks values inside specified reference range.    Electronically signed by  Alexandra Hernandez  09/03/2023 12:12    Prior Signatures:    Attending Reviewed by Alexandra Hernandez - 9/2/2023 5:19:34 PM       Cardiac cath:   Results for orders placed or performed during the hospital encounter of 09/02/23   Cath/PV Case    Narrative    •  Prox LAD lesion with 100% stenosis with left to left and right left   collaterals  •  Prox Cx to Mid Cx lesion with 40% stenosis.  •  Prox RCA to Dist RCA lesion with 80% stenosis.  •  Small nonfunctional right radial artery  •  Pulm and hypertension mean PA 24  •  Preserved cardiac output and cardiac and 5.6 and 2.9  •  Filling pressure EDP 27    CARDIAC  CATHETERIZATION REPORT      CARDIAC CATH INDICATION: Ischemic cardiomyopathy    PROCEDURES PERFORMED:    Access: Ultrasound-guided right femoral and right basilic venous access  2.  Coronary angiography  3.  Left heart cath  4.  Right heart catheterization    Left Heart Cath Pressures:  LV Systolic: 106  LV Begin Diastolic: 8  LV End Diastolic: 31  LV1v PeakdPdt: 1203  Ao Systolic: 104  Ao Diastolic: 63  Ao Mean: 83    Right Heart Cath Pressures:  Findings:  Hemodynamics:  1. Systemic blood pressure and heart rate: 123/74 mmHg, 104 bpm  2. Right atrial pressures:     6/5/2 mmHg  3. Right ventricular pressures:    47/-1/6 mmHg  4. Pulmonary artery pressures:   43/9/24 mmHg  5. Pulmonary capillary wedge pressures: 15/14/11 mmHg  6. Transpulmonary Gradient:    13  8. Pulmonary Artery Pulsatility Index (OMA): 17    Saturations Study:  1. Aortic saturations:     96%  3. Pulmonary artery saturations:   67%    Cardiac Outputs calculated by Luis method:  1. Cardiac output and index:    5.6 L/min, 2.91 L/min/m2  2. Pulmonary vascular resistance:   2.3 Woods Units, 185.2 (dynes x   sec)/cm5  3. Systemic vascular resistance:   14.4 Woods Units, 1153.9 (dynes x   sec)/cm5  4. Right ventricular stroke work index:  616 (mmHg x ml)/m2  5. Cardiac power output:     1.1 W    CONCLUSION:   LAD proximal 100% (short occlusion) with L-L and R-L  LCx 40-50% stenosis  RCA 70-80% stenosis   LVEDP: Elevated 27  Pulmonary hypertension mean PA 24  Preserved cardiac output 5.6 and cardiac index 2.9  Small right renal artery    PLAN:  In the setting of ischemic cardiomyopathy and diabetes ejection fraction   15% patient ideally should be evaluated for CABG, although this patient   unlikely will be a candidate for CABG in the setting of severe   malnutrition with albumin 1.1 which prevents healing and bad   prognosticator, will transfer the patient to [facility] for   optimization of his GDMT and heart failure status and discussed  with CT   surgery and heart failure team the best approach for revascularization  Hold on GDMT in the setting of low blood pressure until evaluated by heart   failure team        Meaghan Gomez MD [facility]  [facility] Interventional Cardiologist       Assessment and Plan:      ASSESSMENT: Loi Meza is a 62 year oldmale with no PMH who presented to the hospital complaining of leg swelling that began 2-3 weeks ago.    PLAN:    Newly onset of acute systolic heart failure EF 16% of unknown etiology  Cardiomyopathy with possible LV thrombus  - Needs LHC and RHC in the settings new onset of CM of unknown etiology***  - Start Lasix 20 BID  - unable to tolerate GDMT for now  - repeat Echo in 3 months to assess improved, HF clinic referral   - Coronary Angiogram findings:   LAD proximal 100% (short occlusion) with L-L and R-L  LCx 40-50% stenosis  RCA 70-80% stenosis   LVEDP: Elevated 27  Pulmonary hypertension mean PA 24  Preserved cardiac output 5.6 and cardiac index 2.9  Small right renal artery  PLAN:  • In the setting of ischemic cardiomyopathy and diabetes ejection fraction 15% patient ideally should be evaluated for CABG, although this patient unlikely will be a candidate for CABG in the setting of severe malnutrition with albumin 1.1 which prevents healing and bad prognosticator, will transfer the patient to [facility] for optimization of his GDMT and heart failure status and discussed with CT surgery and heart failure team the best approach for revascularization  • Hold on GDMT in the setting of low blood pressure until evaluated by heart failure team       ANGIOGRAM INFORMED CONSENT:     I discussed the risks, benefits and alternatives of coronary angiography with possible intervention with the patient.  These were inclusive of but not limited to the risk of allergic and anesthetic complications, risks of strokes, MI, even death, bleeding and bruising, vascular complications, need for emergency  vascular and bypass surgery, and worsening renal insufficiency.  The patient voiced understanding and agreed to proceed.     Shock: Resolved  - possibly cardiogenic  - off pressors       Moderate mitral regurgitation  - Secondary to LV systolic dysfunction  - Optimized treatment of left heart failure     Acute unprovoked DVT of left popliteal vein  - On RA, Trop 153, BNP 37,182  - US LE shows acute DVT of left popliteal, peroneal, and gastrocnemius veins  - Echo shows LVEF 16%, mild aortic regurgitation, moderate to severe MR, mild to moderate increased RVSP, moderate left pleural effusion  - Continue anticoagulation. Transition to OAC on discharge. Price check for Eliquis.      Low HDL cholesterol  - HDL 23  - Start statins if liver enzymes remain stable     Hypokalemia  - low albumin  - unclear etiolgoy     Severe Malnutrition  - Albumin 1.2  - unclear etiology  - r/o Cancer? HIV?  - consider oncology consult     Charted by anuel Ridley for Dr. Vickers  The documentation recorded by the nikiaibe accurately and completely reflects the service(s) I personally performed and the decisions made by me.     Meaghan Vickers MD [facility]  [facility] Interventional Cardiologist  Office [phone]    9/8/2023    The above note was created using dictation using voice recognition software.  While every effort was made to correct the dictation, an occasional error may have been missed.    Patient Privacy Notice     The 21st Century Cures Act makes medical notes like these available to patients in the interest of transparency. Please be advised that this is a medical document. Medical documents are intended to carry relevant information and the clinical opinion of the practitioner. The medical note is intended as medical provider to provider communication, and may appear blunt or direct. It is written in medical language, and may contain abbreviations or verbiage that are unfamiliar.

## 2024-01-23 ENCOUNTER — PATIENT OUTREACH (OUTPATIENT)
Dept: GASTROENTEROLOGY | Facility: CLINIC | Age: 69
End: 2024-01-23
Payer: MEDICARE

## 2024-02-16 ENCOUNTER — VIRTUAL VISIT (OUTPATIENT)
Dept: INTERNAL MEDICINE | Facility: CLINIC | Age: 69
End: 2024-02-16
Payer: COMMERCIAL

## 2024-02-16 DIAGNOSIS — J01.90 ACUTE SINUSITIS WITH SYMPTOMS > 10 DAYS: Primary | ICD-10-CM

## 2024-02-16 PROCEDURE — 99442 PR PHYSICIAN TELEPHONE EVALUATION 11-20 MIN: CPT | Mod: 93 | Performed by: INTERNAL MEDICINE

## 2024-02-16 RX ORDER — CEFDINIR 300 MG/1
300 CAPSULE ORAL 2 TIMES DAILY
Qty: 20 CAPSULE | Refills: 0 | Status: SHIPPED | OUTPATIENT
Start: 2024-02-16 | End: 2024-02-26

## 2024-02-16 NOTE — PROGRESS NOTES
"Abdulaziz is a 68 year old who is being evaluated via a billable telephone visit.      What phone number would you like to be contacted at? [phone]  How would you like to obtain your AVS? Pa    Distant Location (provider location):  On-site    Assessment & Plan     Acute sinusitis with symptoms > 10 days  68-year-old male who is had persistent left-sided sinus congestion for the past several weeks.  It is difficult for him to breathe out of the left side of his nose.  Denies any associated fevers or chills or any headache or ear pain/pressure.  Symptoms may have been gone with a viral URI several weeks ago.  He is also questioning whether he might have a deviated septum from previous injuries.  He has tried saline rinses without much benefit.  He found Vicks decongestant to be helpful but he understands that this is meant to be only for short-term use.  He is also wearing a Breathe Right strip which is partially helpful.  His history is most consistent with a viral URI that resolved but with a secondary bacterial sinus infection that has developed.  I am recommending treating with antibiotics using Omnicef 300 mg twice daily for 10 days.  We discussed risks of taking antibiotics including diarrhea and even C. difficile colitis.  Eating yogurt daily during the 10 days while on the antibiotic should minimize that risk.  I do not think he needs to see an ENT specialist at this time unless he is developing recurrent sinus infections.  He may continue to use the saline rinses and could try OTC Mucinex to loosen secretions and mucus.            BMI  Estimated body mass index is 31.9 kg/m  as calculated from the following:    Height as of 5/8/23: 1.753 m (5' 9\").    Weight as of 5/8/23: 98 kg (216 lb).         Follow-up for annual physical in May 2024    Subjective   Abdulaziz is a 68 year old, presenting for the following health issues: Telephone visit completed today to address the following issues  Sinus " Problem (Sinus congestion, worse at night, left nostril, tried OTC remedies with no relief, Breathe Right strips helped somewhat  )      2/16/2024     1:06 PM   Additional Questions   Roomed by Svitlana KELLER     Sinus Problem     History of Present Illness       Reason for visit:  Chronic sinus congestion  Symptom onset:  3-4 weeks ago  Symptoms include:  Left nostril is congested to the point where sleep has been difficult  Symptom intensity:  Severe  Symptom progression:  Improving  Had these symptoms before:  Yes  Has tried/received treatment for these symptoms:  No  What makes it worse:  No  What makes it better:  Have used Navage and nasal sprays, and though it helped stopped using Vicks oxymetazoline because it says to not use for more than 3 days.  Wed was the first in a number of nights I got a  good sleep. Do  I have a broken nose issue and/or an infection?    He eats 2-3 servings of fruits and vegetables daily.He consumes 1 sweetened beverage(s) daily.He exercises with enough effort to increase his heart rate 30 to 60 minutes per day.  He exercises with enough effort to increase his heart rate 4 days per week.   He is taking medications regularly.         Review of Systems  Constitutional, HEENT, cardiovascular, pulmonary, gi and gu systems are negative, except as otherwise noted.      Objective           Vitals:  No vitals were obtained today due to virtual visit.    Physical Exam   General: Alert and no distress //Respiratory: No audible wheeze, cough, or shortness of breath             Phone call duration: 13 minutes  Signed Electronically by: Jc Rg MD

## 2024-04-10 DIAGNOSIS — I10 PRIMARY HYPERTENSION: ICD-10-CM

## 2024-04-10 RX ORDER — LISINOPRIL 40 MG/1
TABLET ORAL
Qty: 90 TABLET | Refills: 0 | Status: SHIPPED | OUTPATIENT
Start: 2024-04-10 | End: 2024-05-24

## 2024-05-02 ENCOUNTER — OFFICE VISIT (OUTPATIENT)
Dept: INTERNAL MEDICINE | Facility: CLINIC | Age: 69
End: 2024-05-02
Payer: MEDICARE

## 2024-05-02 VITALS
TEMPERATURE: 98 F | HEART RATE: 89 BPM | WEIGHT: 204 LBS | BODY MASS INDEX: 30.21 KG/M2 | OXYGEN SATURATION: 98 % | SYSTOLIC BLOOD PRESSURE: 138 MMHG | RESPIRATION RATE: 16 BRPM | DIASTOLIC BLOOD PRESSURE: 78 MMHG | HEIGHT: 69 IN

## 2024-05-02 DIAGNOSIS — I10 PRIMARY HYPERTENSION: ICD-10-CM

## 2024-05-02 DIAGNOSIS — R10.11 ABDOMINAL PAIN, RIGHT UPPER QUADRANT: Primary | ICD-10-CM

## 2024-05-02 LAB
ERYTHROCYTE [DISTWIDTH] IN BLOOD BY AUTOMATED COUNT: 13.2 % (ref 10–15)
HCT VFR BLD AUTO: 46 % (ref 40–53)
HGB BLD-MCNC: 15.4 G/DL (ref 13.3–17.7)
MCH RBC QN AUTO: 29.5 PG (ref 26.5–33)
MCHC RBC AUTO-ENTMCNC: 33.5 G/DL (ref 31.5–36.5)
MCV RBC AUTO: 88 FL (ref 78–100)
PLATELET # BLD AUTO: 170 10E3/UL (ref 150–450)
RBC # BLD AUTO: 5.22 10E6/UL (ref 4.4–5.9)
WBC # BLD AUTO: 9.8 10E3/UL (ref 4–11)

## 2024-05-02 PROCEDURE — 36415 COLL VENOUS BLD VENIPUNCTURE: CPT | Performed by: INTERNAL MEDICINE

## 2024-05-02 PROCEDURE — 80076 HEPATIC FUNCTION PANEL: CPT | Performed by: INTERNAL MEDICINE

## 2024-05-02 PROCEDURE — G2211 COMPLEX E/M VISIT ADD ON: HCPCS | Performed by: INTERNAL MEDICINE

## 2024-05-02 PROCEDURE — 83690 ASSAY OF LIPASE: CPT | Performed by: INTERNAL MEDICINE

## 2024-05-02 PROCEDURE — 85027 COMPLETE CBC AUTOMATED: CPT | Performed by: INTERNAL MEDICINE

## 2024-05-02 PROCEDURE — 99214 OFFICE O/P EST MOD 30 MIN: CPT | Performed by: INTERNAL MEDICINE

## 2024-05-02 NOTE — PROGRESS NOTES
"  Assessment & Plan     Abdominal pain, right upper quadrant  68-year-old male here to discuss recurrent episodes of right upper quadrant abdominal pain.  He has had 3 episodes of the past 2 weeks including last night.  Describes pain that can be severe in his right upper quadrant of his abdomen.  The pain is radiating into his back.  Associated with some mild nausea and bloating.  He is concerned understandably that this could represent gallstones.  No previous history of stones.  Peptic ulcer disease is another consideration.  He does not use NSAIDs nor is he a smoker.  Exam without any tenderness in his upper abdomen and negative Pearce sign.  At this time, unclear etiology of his symptoms but choledocholithiasis and peptic ulcer disease with duodenal ulcer are most likely causes.  Will check CBC, LFTs and lipase and will make arrangements for a right upper quadrant abdominal ultrasound.  If his labs are normal, I will suggest that he start on 20 mg of omeprazole daily until the results of the ultrasound are available.  If this is peptic ulcer disease, checking H. pylori status would make sense.  - Lipase; Future  - Hepatic panel (Albumin, ALT, AST, Bili, Alk Phos, TP); Future  - CBC with platelets; Future  - US Abdomen Limited; Future    Primary hypertension  Blood pressure looks reasonably well-controlled taking current medications including lisinopril 40 mg daily    The longitudinal plan of care for the diagnosis(es)/condition(s) as documented were addressed during this visit. Due to the added complexity in care, I will continue to support Abdulaziz in the subsequent management and with ongoing continuity of care.             BMI  Estimated body mass index is 30.13 kg/m  as calculated from the following:    Height as of this encounter: 1.753 m (5' 9\").    Weight as of this encounter: 92.5 kg (204 lb).         He will return in 1 week for his annual wellness visit    Subjective   Abdulaziz is a 68 year old, " "presenting for the following health issues:  Abdominal Pain (RMQ pain that radiates to back, bloating, gas, nausea. 3 episodes in the last 12 days)      5/2/2024     1:53 PM   Additional Questions   Roomed by      History of Present Illness       Reason for visit:  Gallstone symptoms  Symptom onset:  1-2 weeks ago  Symptoms include:  See my summary under Health Issues above regarding episodes of pain under right rib cage and back, sometimes bloating, gas and nausea, and upon retrospect changes in result of bowel movements and  Symptom intensity:  Severe  Symptom progression:  Staying the same  Had these symptoms before:  Yes  Has tried/received treatment for these symptoms:  No  What makes it worse:  Can't tell - as to the above, in retrospect I can recall prior episodes, that though noticeable, were no where near as severe and I ascribed to overeating or temporary stomach flew  What makes it better:  Not sure    He eats 2-3 servings of fruits and vegetables daily.He consumes 1 sweetened beverage(s) daily.He exercises with enough effort to increase his heart rate 30 to 60 minutes per day.  He exercises with enough effort to increase his heart rate 4 days per week.   He is taking medications regularly.           Review of Systems  Constitutional, HEENT, cardiovascular, pulmonary, gi and gu systems are negative, except as otherwise noted.      Objective    /78 (BP Location: Right arm, Patient Position: Sitting, Cuff Size: Adult Regular)   Pulse 89   Temp 98  F (36.7  C) (Oral)   Resp 16   Ht 1.753 m (5' 9\")   Wt 92.5 kg (204 lb)   SpO2 98%   BMI 30.13 kg/m    Body mass index is 30.13 kg/m .  Physical Exam   Well-appearing middle-age male  No tenderness with palpation of abdomen.  Negative Pearce sign.  No hepatosplenomegaly or masses        Signed Electronically by: Jc Rg MD    "

## 2024-05-03 ENCOUNTER — HOSPITAL ENCOUNTER (OUTPATIENT)
Dept: ULTRASOUND IMAGING | Facility: CLINIC | Age: 69
Discharge: HOME | End: 2024-05-03
Attending: INTERNAL MEDICINE | Admitting: INTERNAL MEDICINE
Payer: MEDICARE

## 2024-05-03 DIAGNOSIS — R10.11 ABDOMINAL PAIN, RIGHT UPPER QUADRANT: ICD-10-CM

## 2024-05-03 DIAGNOSIS — K80.00 CALCULUS OF GALLBLADDER WITH ACUTE CHOLECYSTITIS WITHOUT OBSTRUCTION: Primary | ICD-10-CM

## 2024-05-03 LAB
ALBUMIN SERPL BCG-MCNC: 4.3 G/DL (ref 3.5–5.2)
ALP SERPL-CCNC: 156 U/L (ref 40–150)
ALT SERPL W P-5'-P-CCNC: 292 U/L (ref 0–70)
AST SERPL W P-5'-P-CCNC: 129 U/L (ref 0–45)
BILIRUB DIRECT SERPL-MCNC: 1.55 MG/DL (ref 0–0.3)
BILIRUB SERPL-MCNC: 2.8 MG/DL
LIPASE SERPL-CCNC: 30 U/L (ref 13–60)
PROT SERPL-MCNC: 7.6 G/DL (ref 6.4–8.3)

## 2024-05-03 PROCEDURE — 76705 ECHO EXAM OF ABDOMEN: CPT

## 2024-05-09 ENCOUNTER — NURSE TRIAGE (OUTPATIENT)
Dept: NURSING | Facility: CLINIC | Age: 69
End: 2024-05-09

## 2024-05-09 NOTE — TELEPHONE ENCOUNTER
Nurse Triage SBAR    Is this a 2nd Level Triage? NO    Situation: Bloating/gas pains    Background: Pt reports he had his gallbladder removed on 5/4/24. He states prior to the sx he had been having some bloating and gas pain for the past 3 wks. Reports his provider started him on Prilosec for his symptoms.     Assessment: C/o mild-moderate abdominal swelling/bloating & gas pains. Denies pain in his RLQ. Denies any fevers. Reports he has had Bms since his procedure.     Protocol Recommended Disposition:   See PCP Within 24 Hours    Recommendation: See PCP within 24 hrs or UC. Protocol and care advice reviewed. Advised to call back with any new or worsening signs, symptoms, concerns, or questions. They verbalized understanding and agreed to follow advice given.     Reason for Disposition   [1] MILD abdomen pain (e.g., does not interfere with normal activities) AND [2] pain comes and goes (cramps) AND [3] present > 48 hours  (Exception: Mild abdomen pain is a chronic symptom, recurrent or ongoing AND present > 4 weeks.)    Additional Information   Negative: Sounds like a life-threatening emergency to the triager   Negative: [1] MODERATE-SEVERE SWELLING of abdomen (e.g., looks very distended or swollen) AND [2] NEW-onset or much worse AND [3] vomiting   Negative: Blood in bowel movements  (Exception: Blood on surface of BM with constipation.)   Negative: Black or tarry bowel movements  (Exception: Chronic-unchanged black-grey BMs AND is taking iron pills or Pepto-Bismol.)   Negative: [1] MODERATE-SEVERE SWELLING of abdomen (e.g., looks very distended or swollen) AND [2] NEW-onset or much worse   Negative: [1] MILD SWELLING of abdomen (e.g., looks distended or swollen) AND [2] new-onset or getting worse AND [3] fever   Negative: [1] MILD-MODERATE abdomen pain AND [2] constant AND [3] present > 2 hours   Negative: [1] Vomiting AND [2] contains bile (green color)    Protocols used: Abdomen Bloating and  Swelling-A-    Jennifer Franks RN on 5/9/2024 at 4:15 PM

## 2024-05-10 ENCOUNTER — OFFICE VISIT (OUTPATIENT)
Dept: INTERNAL MEDICINE | Facility: CLINIC | Age: 69
End: 2024-05-10
Payer: MEDICARE

## 2024-05-10 VITALS
WEIGHT: 196 LBS | DIASTOLIC BLOOD PRESSURE: 70 MMHG | HEART RATE: 67 BPM | OXYGEN SATURATION: 96 % | RESPIRATION RATE: 16 BRPM | TEMPERATURE: 97.9 F | SYSTOLIC BLOOD PRESSURE: 98 MMHG | BODY MASS INDEX: 29.03 KG/M2 | HEIGHT: 69 IN

## 2024-05-10 DIAGNOSIS — K81.0 ACUTE CHOLECYSTITIS: ICD-10-CM

## 2024-05-10 DIAGNOSIS — R10.84 ABDOMINAL PAIN, GENERALIZED: Primary | ICD-10-CM

## 2024-05-10 LAB
ALBUMIN SERPL BCG-MCNC: 4.2 G/DL (ref 3.5–5.2)
ALP SERPL-CCNC: 283 U/L (ref 40–150)
ALT SERPL W P-5'-P-CCNC: 215 U/L (ref 0–70)
ANION GAP SERPL CALCULATED.3IONS-SCNC: 9 MMOL/L (ref 7–15)
AST SERPL W P-5'-P-CCNC: 115 U/L (ref 0–45)
BILIRUB SERPL-MCNC: 1.2 MG/DL
BUN SERPL-MCNC: 20 MG/DL (ref 8–23)
CALCIUM SERPL-MCNC: 9.3 MG/DL (ref 8.8–10.2)
CHLORIDE SERPL-SCNC: 97 MMOL/L (ref 98–107)
CREAT SERPL-MCNC: 0.91 MG/DL (ref 0.67–1.17)
DEPRECATED HCO3 PLAS-SCNC: 29 MMOL/L (ref 22–29)
EGFRCR SERPLBLD CKD-EPI 2021: >90 ML/MIN/1.73M2
ERYTHROCYTE [DISTWIDTH] IN BLOOD BY AUTOMATED COUNT: 12.8 % (ref 10–15)
GLUCOSE SERPL-MCNC: 123 MG/DL (ref 70–99)
HCT VFR BLD AUTO: 45.7 % (ref 40–53)
HGB BLD-MCNC: 15.2 G/DL (ref 13.3–17.7)
LIPASE SERPL-CCNC: 65 U/L (ref 13–60)
MCH RBC QN AUTO: 29.8 PG (ref 26.5–33)
MCHC RBC AUTO-ENTMCNC: 33.3 G/DL (ref 31.5–36.5)
MCV RBC AUTO: 90 FL (ref 78–100)
PLATELET # BLD AUTO: 265 10E3/UL (ref 150–450)
POTASSIUM SERPL-SCNC: 4.6 MMOL/L (ref 3.4–5.3)
PROT SERPL-MCNC: 7.2 G/DL (ref 6.4–8.3)
RBC # BLD AUTO: 5.1 10E6/UL (ref 4.4–5.9)
SODIUM SERPL-SCNC: 135 MMOL/L (ref 135–145)
WBC # BLD AUTO: 7.2 10E3/UL (ref 4–11)

## 2024-05-10 PROCEDURE — 82248 BILIRUBIN DIRECT: CPT | Performed by: INTERNAL MEDICINE

## 2024-05-10 PROCEDURE — 99214 OFFICE O/P EST MOD 30 MIN: CPT | Performed by: INTERNAL MEDICINE

## 2024-05-10 PROCEDURE — 83690 ASSAY OF LIPASE: CPT | Performed by: INTERNAL MEDICINE

## 2024-05-10 PROCEDURE — 85027 COMPLETE CBC AUTOMATED: CPT | Performed by: INTERNAL MEDICINE

## 2024-05-10 PROCEDURE — 80053 COMPREHEN METABOLIC PANEL: CPT | Performed by: INTERNAL MEDICINE

## 2024-05-10 PROCEDURE — 36415 COLL VENOUS BLD VENIPUNCTURE: CPT | Performed by: INTERNAL MEDICINE

## 2024-05-10 RX ORDER — NICOTINE POLACRILEX 4 MG/1
20 GUM, CHEWING ORAL
COMMUNITY
End: 2024-05-10

## 2024-05-10 RX ORDER — SENNOSIDES A AND B 8.6 MG/1
1 TABLET, FILM COATED ORAL 2 TIMES DAILY
COMMUNITY

## 2024-05-10 NOTE — PROGRESS NOTES
"  Assessment & Plan     Abdominal pain, generalized  68-year-old male recently evaluated for generalized abdominal pain/bloating found to have elevated LFTs and subsequently diagnosed with acute cholecystitis after presenting the ER over the last week and.  Pathology has returned showing chronic cholecystitis.  His MRCP did not show any stones in his bile duct and his common bile duct was on the upper limits of normal but no obstructive process noted.  LFTs were again elevated during his ER evaluation.  Since his discharge, he is having ongoing generalized abdominal discomfort and bloating with symptoms similar to what he was experiencing last week prior to having his cholecystectomy.  On exam his surgical wounds look good.  Abdomen is soft.  Only mild tenderness with palpation.  Nevertheless, I am concerned that his symptoms may represent a retained stone in his common bile duct that could not be appreciated on the MRCP that was performed.  I am repeating his LFTs and if they are persistently elevated, he will need an ERCP.  As it is late on Friday afternoon, this would be most expeditiously completed by returning to the ER to be evaluated by gastroenterology immediately.  - Hepatic panel (Albumin, ALT, AST, Bili, Alk Phos, TP); Future  - Lipase; Future  - CBC with platelets; Future  - Lipase  - CBC with platelets  - Bilirubin direct    Acute cholecystitis  As above            BMI  Estimated body mass index is 28.94 kg/m  as calculated from the following:    Height as of this encounter: 1.753 m (5' 9\").    Weight as of this encounter: 88.9 kg (196 lb).         See Patient Instructions    Luis Cintron is a 68 year old, presenting for the following health issues:  Abdominal Pain (Bloating and gas off and on)      5/10/2024     2:42 PM   Additional Questions   Roomed by Guerline ADAMES           Review of Systems  Constitutional, HEENT, cardiovascular, pulmonary, gi and gu systems are negative, except as otherwise " "noted.      Objective    BP 98/70 (BP Location: Right arm, Patient Position: Sitting)   Pulse 67   Temp 97.9  F (36.6  C)   Resp 16   Ht 1.753 m (5' 9\")   Wt 88.9 kg (196 lb)   SpO2 96%   BMI 28.94 kg/m    Body mass index is 28.94 kg/m .  Physical Exam   Incisions healing well  Abdomen soft with only mild tenderness diffusely.  No rebound tenderness.        Signed Electronically by: Jc Rg MD    "

## 2024-05-10 NOTE — PATIENT INSTRUCTIONS
If your liver enzymes, AST, ALT and alkaline phosphatase are still elevated, I would be concerned that you have a retained stone in your bile duct.  If this is the case and you are having ongoing symptoms, you should be seen immediately and I would head to the ER this evening.    You will need a procedure called an ERCP.

## 2024-05-11 LAB — BILIRUB DIRECT SERPL-MCNC: 0.53 MG/DL (ref 0–0.3)

## 2024-05-13 DIAGNOSIS — R10.84 ABDOMINAL PAIN, GENERALIZED: Primary | ICD-10-CM

## 2024-05-23 SDOH — HEALTH STABILITY: PHYSICAL HEALTH: ON AVERAGE, HOW MANY DAYS PER WEEK DO YOU ENGAGE IN MODERATE TO STRENUOUS EXERCISE (LIKE A BRISK WALK)?: 4 DAYS

## 2024-05-23 SDOH — HEALTH STABILITY: PHYSICAL HEALTH: ON AVERAGE, HOW MANY MINUTES DO YOU ENGAGE IN EXERCISE AT THIS LEVEL?: 40 MIN

## 2024-05-23 ASSESSMENT — SOCIAL DETERMINANTS OF HEALTH (SDOH): HOW OFTEN DO YOU GET TOGETHER WITH FRIENDS OR RELATIVES?: MORE THAN THREE TIMES A WEEK

## 2024-05-24 ENCOUNTER — OFFICE VISIT (OUTPATIENT)
Dept: INTERNAL MEDICINE | Facility: CLINIC | Age: 69
End: 2024-05-24
Payer: MEDICARE

## 2024-05-24 VITALS
TEMPERATURE: 98 F | OXYGEN SATURATION: 98 % | RESPIRATION RATE: 16 BRPM | DIASTOLIC BLOOD PRESSURE: 80 MMHG | WEIGHT: 205 LBS | SYSTOLIC BLOOD PRESSURE: 138 MMHG | HEIGHT: 69 IN | HEART RATE: 79 BPM | BODY MASS INDEX: 30.36 KG/M2

## 2024-05-24 DIAGNOSIS — Z82.49 FAMILY HISTORY OF PREMATURE CORONARY ARTERY DISEASE: ICD-10-CM

## 2024-05-24 DIAGNOSIS — I10 PRIMARY HYPERTENSION: ICD-10-CM

## 2024-05-24 DIAGNOSIS — Z12.5 SCREENING FOR PROSTATE CANCER: ICD-10-CM

## 2024-05-24 DIAGNOSIS — E78.00 HYPERCHOLESTEREMIA: ICD-10-CM

## 2024-05-24 DIAGNOSIS — Z00.00 ENCOUNTER FOR MEDICARE ANNUAL WELLNESS EXAM: Primary | ICD-10-CM

## 2024-05-24 DIAGNOSIS — K81.0 ACUTE CHOLECYSTITIS: ICD-10-CM

## 2024-05-24 DIAGNOSIS — I77.810 ASCENDING AORTA DILATATION (H): ICD-10-CM

## 2024-05-24 DIAGNOSIS — Z86.0100 PERSONAL HISTORY OF COLONIC POLYPS: ICD-10-CM

## 2024-05-24 DIAGNOSIS — K40.90 UNILATERAL INGUINAL HERNIA WITHOUT OBSTRUCTION OR GANGRENE, RECURRENCE NOT SPECIFIED: ICD-10-CM

## 2024-05-24 PROBLEM — R10.11 ABDOMINAL PAIN, RIGHT UPPER QUADRANT: Status: RESOLVED | Noted: 2024-05-02 | Resolved: 2024-05-24

## 2024-05-24 PROBLEM — R10.84 ABDOMINAL PAIN, GENERALIZED: Status: RESOLVED | Noted: 2024-05-10 | Resolved: 2024-05-24

## 2024-05-24 PROBLEM — J01.90 ACUTE SINUSITIS WITH SYMPTOMS > 10 DAYS: Status: RESOLVED | Noted: 2024-02-16 | Resolved: 2024-05-24

## 2024-05-24 PROCEDURE — G0439 PPPS, SUBSEQ VISIT: HCPCS | Performed by: INTERNAL MEDICINE

## 2024-05-24 PROCEDURE — G0103 PSA SCREENING: HCPCS | Performed by: INTERNAL MEDICINE

## 2024-05-24 PROCEDURE — 99213 OFFICE O/P EST LOW 20 MIN: CPT | Mod: 25 | Performed by: INTERNAL MEDICINE

## 2024-05-24 PROCEDURE — 80061 LIPID PANEL: CPT | Performed by: INTERNAL MEDICINE

## 2024-05-24 PROCEDURE — 36415 COLL VENOUS BLD VENIPUNCTURE: CPT | Performed by: INTERNAL MEDICINE

## 2024-05-24 RX ORDER — LISINOPRIL 40 MG/1
40 TABLET ORAL DAILY
Qty: 90 TABLET | Refills: 3 | Status: SHIPPED | OUTPATIENT
Start: 2024-05-24

## 2024-05-24 NOTE — PATIENT INSTRUCTIONS
"Preventive Care Advice   This is general advice we often give to help people stay healthy. Your care team may have specific advice just for you. Please talk to your care team about your own preventive care needs.  Lifestyle  Exercise at least 150 minutes each week (30 minutes a day, 5 days a week).  Do muscle strengthening activities 2 days a week. These help control your weight and prevent disease.  No smoking.  Wear sunscreen to prevent skin cancer.  Have your home tested for radon every 2 to 5 years. Radon is a colorless, odorless gas that can harm your lungs. To learn more, go to www.health.[facility].[place]. and search for \"Radon in Homes.\"  Keep guns unloaded and locked up in a safe place like a safe or gun vault, or, use a gun lock and hide the keys. Always lock away bullets separately. To learn more, visit [employer].[place].gov and search for \"safe gun storage.\"  Nutrition  Eat 5 or more servings of fruits and vegetables each day.  Try wheat bread, brown rice and whole grain pasta (instead of white bread, rice, and pasta).  Get enough calcium and vitamin D. Check the label on foods and aim for 100% of the RDA (recommended daily allowance).  Regular exams  Have a dental exam and cleaning every 6 months.  Annual eye exam  See your health care team every year to talk about:  Any changes in your health.  Any medicines your care team has prescribed.  Preventive care, family planning, and ways to prevent chronic diseases.  Shots (vaccines)   COVID-19 shot: Get this shot when it's due this fall.  Flu shot: Get a flu shot every year.  Tetanus shot: Get a tetanus shot every 10 years.  You are due to get a tetanus booster this summer.  You can schedule a Td vaccine at your pharmacy    Shingles shot (for age 50 and up).  Completed  General health tests  Diabetes screening: Annually  Cholesterol test: Annually  Hepatitis C: Get tested at least once in your life.  CT coronary calcium score is being ordered to assess your cardiovascular " risk  STIs (sexually transmitted infections)  Before age 24: Ask your care team if you should be screened for STIs.  After age 24: Get screened for STIs if you're at risk. You are at risk for STIs (including HIV) if:  You are sexually active with more than one person.  You don't use condoms every time.  You or a partner was diagnosed with a sexually transmitted infection.  If you are at risk for HIV, ask about PrEP medicine to prevent HIV.  Get tested for HIV at least once in your life, whether you are at risk for HIV or not.  Cancer screening tests  Colon cancer screening: It is important to start screening for colon cancer at age 45.  Colonoscopy will be due in 2027  Prostate cancer screening test: Annual PSA  For informational purposes only. Not to replace the advice of your health care provider. Copyright   2023 [employer]. All rights reserved. Clinically reviewed by the [facility]. Kymeta [id number] - REV 04/24.    Hearing Loss: Care Instructions  Overview     Hearing loss is a sudden or slow decrease in how well you hear. It can range from slight to profound. Permanent hearing loss can occur with aging. It also can happen when you are exposed long-term to loud noise. Examples include listening to loud music, riding motorcycles, or being around other loud machines.  Hearing loss can affect your work and home life. It can make you feel lonely or depressed. You may feel that you have lost your independence. But hearing aids and other devices can help you hear better and feel connected to others.  Follow-up care is a key part of your treatment and safety. Be sure to make and go to all appointments, and call your doctor if you are having problems. It's also a good idea to know your test results and keep a list of the medicines you take.  How can you care for yourself at home?  Avoid loud noises whenever possible. This helps keep your hearing from getting worse.  Always wear  "hearing protection around loud noises.  Wear a hearing aid as directed.  A professional can help you pick a hearing aid that will work best for you.  You can also get hearing aids over the counter for mild to moderate hearing loss.  Have hearing tests as your doctor suggests. They can show whether your hearing has changed. Your hearing aid may need to be adjusted.  Use other devices as needed. These may include:  Telephone amplifiers and hearing aids that can connect to a television, stereo, radio, or microphone.  Devices that use lights or vibrations. These alert you to the doorbell, a ringing telephone, or a baby monitor.  Television closed-captioning. This shows the words at the bottom of the screen. Most new TVs can do this.  TTY (text telephone). This lets you type messages back and forth on the telephone instead of talking or listening. These devices are also called TDD. When messages are typed on the keyboard, they are sent over the phone line to a receiving TTY. The message is shown on a monitor.  Use text messaging, social media, and email if it is hard for you to communicate by telephone.  Try to learn a listening technique called speechreading. It is not lipreading. You pay attention to people's gestures, expressions, posture, and tone of voice. These clues can help you understand what a person is saying. Face the person you are talking to, and have them face you. Make sure the lighting is good. You need to see the other person's face clearly.  Think about counseling if you need help to adjust to your hearing loss.  When should you call for help?  Watch closely for changes in your health, and be sure to contact your doctor if:    You think your hearing is getting worse.     You have new symptoms, such as dizziness or nausea.   Where can you learn more?  Go to https://www.healthwise.net/patiented  Enter R798 in the search box to learn more about \"Hearing Loss: Care Instructions.\"  Current as of: September " 27, 2023               Content Version: 14.0    9846-5923 [employer].   Care instructions adapted under license by your healthcare professional. If you have questions about a medical condition or this instruction, always ask your healthcare professional. [employer] disclaims any warranty or liability for your use of this information.

## 2024-05-24 NOTE — PROGRESS NOTES
Preventive Care Visit  [facility]  Jc Rg MD, Internal Medicine  May 24, 2024      Assessment & Plan     Encounter for Medicare annual wellness exam  Immunizations are reviewed and recommending getting a Td booster this summer.  COVID vaccination discussed.  Everything else is up-to-date.  Living will discussed.  Non-smoker.  Uses alcohol in moderation.  Regular exercise and good diet habits to maintain a healthy weight discussed.  Up to date with colonoscopies and this should be repeated in 2027.  Prostate exam is deferred but I will check a PSA for prostate cancer screening.  Dementia and depression screening completed.  He is getting an annual eye exam completed.  Seeing a dentist every 6 months recommended.  Skin exam performed and recommending regular use of sunblock.  Hepatitis C antibody for screening was normal.  Will screen for diabetes with fasting glucose.     Primary hypertension  Blood pressure is reasonably well-controlled with current dose of lisinopril 40 mg daily tolerating medication without side effect  - lisinopril (ZESTRIL) 40 MG tablet; Take 1 tablet (40 mg) by mouth daily    Acute cholecystitis  Hospitalized last month with acute cholecystitis.  Underwent cholecystectomy.  Some persistent LFTs and vague abdominal complaints but the symptoms have resolved and his LFTs have now returned to normal.    Hypercholesteremia and family history of premature coronary artery disease  Rechecking lipid profile.  10-year risk for ASCVD is over 25% and I am recommending getting a CT coronary calcium score completed to help determine whether he might benefit from starting a statin or whether any further workup is needed.  - Lipid Profile (Chol, Trig, HDL, LDL calc); Future  - CT Coronary Calcium Scan; Future        Ascending aorta dilatation (H24)  Mildly dilated ascending aorta noted in 2019 on echocardiogram.  Imaging should be repeated this year.  Will await results  "of his CT coronary calcium score to determine whether any additional imaging is needed    Unilateral inguinal hernia without obstruction or gangrene, recurrence not specified  He should have his inguinal hernia repaired but understandably wants to wait after having recent cholecystectomy    Screening for prostate cancer  Annual PSA for prostate cancer screening  - PSA, screen; Future    Personal history of colonic polyps  Colonoscopy should be repeated in December 2027    Patient has been advised of split billing requirements and indicates understanding: Yes        BMI  Estimated body mass index is 30.27 kg/m  as calculated from the following:    Height as of this encounter: 1.753 m (5' 9\").    Weight as of this encounter: 93 kg (205 lb).       Counseling  Appropriate preventive services were discussed with this patient, including applicable screening as appropriate for fall prevention, nutrition, physical activity, Tobacco-use cessation, weight loss and cognition.  Checklist reviewing preventive services available has been given to the patient.  Reviewed patient's diet, addressing concerns and/or questions.   The patient was provided with written information regarding signs of hearing loss.       Follow-up in 1 year for annual wellness visit    Luis Cintron is a 68 year old, presenting for the following: Here for his annual wellness visit and to follow-up medical problems including hypertension, hypercholesterolemia, recent acute cholecystitis, and family history of heart disease.  See assessment and plan for details  Physical (fasting)        5/24/2024     1:59 PM   Additional Questions   Roomed by Jacqueline         Health Care Directive  Patient does not have a Health Care Directive or Living Will:           5/23/2024   General Health   How would you rate your overall physical health? Good   Feel stress (tense, anxious, or unable to sleep) Not at all         5/23/2024   Nutrition   Diet: Regular (no " restrictions)         5/23/2024   Exercise   Days per week of moderate/strenous exercise 4 days   Average minutes spent exercising at this level 40 min         5/23/2024   Social Factors   Frequency of gathering with friends or relatives More than three times a week   Worry food won't last until get money to buy more No   Food not last or not have enough money for food? No   Do you have housing?  Yes   Are you worried about losing your housing? No   Lack of transportation? No   Unable to get utilities (heat,electricity)? No         5/23/2024   Fall Risk   Fallen 2 or more times in the past year? No    No   Trouble with walking or balance? No    No          5/23/2024   Activities of Daily Living- Home Safety   Needs help with the following daily activites None of the above   Safety concerns in the home None of the above         5/23/2024   Dental   Dentist two times every year? Yes         5/23/2024   Hearing Screening   Hearing concerns? (!) IT'S HARD TO FOLLOW A CONVERSATION IN A NOISY RESTAURANT OR CROWDED ROOM.    (!) TROUBLE UNDERSTANDING SOFT OR WHISPERED SPEECH.         5/23/2024   Driving Risk Screening   Patient/family members have concerns about driving No         5/23/2024   General Alertness/Fatigue Screening   Have you been more tired than usual lately? No         5/23/2024   Urinary Incontinence Screening   Bothered by leaking urine in past 6 months No         5/23/2024   TB Screening   Were you born outside of the [place]? No         Today's PHQ-2 Score:       5/23/2024     3:40 PM   PHQ-2 ( 1999 Pfizer)   Q1: Little interest or pleasure in doing things 0   Q2: Feeling down, depressed or hopeless 0   PHQ-2 Score 0   Q1: Little interest or pleasure in doing things Not at all   Q2: Feeling down, depressed or hopeless Not at all   PHQ-2 Score 0           5/23/2024   Substance Use   Alcohol more than 3/day or more than 7/wk No   Do you have a current opioid prescription? No   How severe/bad is pain from 1 to  10? 0/10 (No Pain)   Do you use any other substances recreationally? No     Social History     Tobacco Use    Smoking status: Never     Passive exposure: Never    Smokeless tobacco: Never   Vaping Use    Vaping status: Never Used   Substance Use Topics    Alcohol use: Yes     Comment: Alcoholic Drinks/day: weekend 2-3    Drug use: Never           5/23/2024   AAA Screening   Family history of Abdominal Aortic Aneurysm (AAA)? Unsure   ASCVD Risk   The 10-year ASCVD risk score (Bay RODRIGUEZ, et al., 2019) is: 25.2%    Values used to calculate the score:      Age: 68 years      Sex: Male      Is Non- : No      Diabetic: No      Tobacco smoker: No      Systolic Blood Pressure: 138 mmHg      Is BP treated: Yes      HDL Cholesterol: 40 mg/dL      Total Cholesterol: 190 mg/dL            Reviewed and updated as needed this visit by Provider                    Past Medical History:   Diagnosis Date    Abdominal pain, right upper quadrant 05/02/2024    Acute cholecystitis 05/04/2024    Acute hip pain, left 04/25/2022    Acute sinusitis with symptoms > 10 days 02/16/2024    Ascending aorta dilatation (H24) 11/05/2019    Found on echocardiogram April 2019.  Plan for CTA chest April 2020    Chronic left shoulder pain 09/12/2022    Family history of premature coronary artery disease     Hearing loss     left ear since childhood    Herpes zoster 2014    History of bilateral knee replacement 05/08/2023    HTN (hypertension)     Hypercholesteremia     Impaired fasting glucose     Fasting glucose 113 December 2018.  Check A1c at return    Inguinal hernia     right    Left-sided chest pain 11/05/2019    Normal nuclear stress test 2019    Osteoarthritis of both knees     Bilateral TKA 2015    Personal history of colonic polyps     Colonoscopy October 2016 normal.  Colonoscopy December 2022 with adenomatous polyp.  Repeat in 5 years    Primary osteoarthritis of left hip 05/24/2022    Left hip replacement     "Recurrent shoulder dislocation      Past Surgical History:   Procedure Laterality Date    TOTAL KNEE ARTHROPLASTY Bilateral 01/2015    VASECTOMY       Current providers sharing in care for this patient include:  Patient Care Team:  Jc Rg MD as PCP - General  Jc Rg MD as Assigned PCP    The following health maintenance items are reviewed in Epic and correct as of today:  Health Maintenance   Topic Date Due    COVID-19 Vaccine (8 - 2023-24 season) 03/25/2024    LIPID  05/08/2024    MEDICARE ANNUAL WELLNESS VISIT  05/08/2024    INFLUENZA VACCINE (Season Ended) 09/01/2024    DTAP/TDAP/TD IMMUNIZATION (2 - Td or Tdap) 09/09/2024    ANNUAL REVIEW OF HM ORDERS  05/10/2025    FALL RISK ASSESSMENT  05/24/2025    GLUCOSE  05/10/2027    ADVANCE CARE PLANNING  05/09/2028    COLORECTAL CANCER SCREENING  12/06/2029    HEPATITIS C SCREENING  Completed    PHQ-2 (once per calendar year)  Completed    Pneumococcal Vaccine: 65+ Years  Completed    ZOSTER IMMUNIZATION  Completed    RSV VACCINE (Pregnancy & 60+)  Completed    IPV IMMUNIZATION  Aged Out    HPV IMMUNIZATION  Aged Out    MENINGITIS IMMUNIZATION  Aged Out    RSV MONOCLONAL ANTIBODY  Aged Out         Review of Systems  Constitutional, HEENT, cardiovascular, pulmonary, GI, , musculoskeletal, neuro, skin, endocrine and psych systems are negative, except as otherwise noted.     Objective    Exam  /80 (BP Location: Right arm, Patient Position: Sitting, Cuff Size: Adult Regular)   Pulse 79   Temp 98  F (36.7  C)   Resp 16   Ht 1.753 m (5' 9\")   Wt 93 kg (205 lb)   SpO2 98%   BMI 30.27 kg/m     Estimated body mass index is 30.27 kg/m  as calculated from the following:    Height as of this encounter: 1.753 m (5' 9\").    Weight as of this encounter: 93 kg (205 lb).    Physical Exam  EYES: Eyelids, conjunctiva, and sclera were normal. Pupils were normal. Cornea, iris, and lens were normal bilaterally.  HEAD, EARS, NOSE, MOUTH, AND THROAT: " Head and face were normal. TMs and external auditory canals are normal.  Oropharynx normal  NECK: Neck appearance was normal. There were no neck masses and the thyroid was not enlarged and no nodules are felt.  No lymphadenopathy.  RESPIRATORY: Breathing pattern was normal and the chest moved symmetrically.   Lung sounds were normal and there were no rales or wheezes.  CARDIOVASCULAR: Heart rate and rhythm were normal.  S1 and S2 were normal and there were no extra sounds or murmurs. Peripheral pulses in arms and legs were normal.  There was no peripheral edema.  No carotid bruits.  GASTROINTESTINAL:  Bowel sounds were present.   Palpation detected no tenderness, mass, or enlarged organs.   RECTAL/PROSTATE: Deferred  MUSCULOSKELETAL: Skeletal configuration was normal and muscle mass was normal for age. Joint appearance was overall normal.  LYMPHATIC: There were no enlarged nodes.  SKIN/HAIR/NAILS: Skin color was normal.  There were no concerning skin lesions.  NEUROLOGIC: The patient was alert and oriented to person, place, time, and circumstance. Speech was normal. Cranial nerves were normal. Motor strength was normal for age. The patient was normally coordinated.  Sensation intact.  PSYCHIATRIC:  Mood and affect were normal and the patient had normal recent and remote memory. The patient's judgment and insight were normal.         5/24/2024   Mini Cog   Clock Draw Score 2 Normal   3 Item Recall 3 objects recalled   Mini Cog Total Score 5              Signed Electronically by: Jc Rg MD

## 2024-05-25 LAB
CHOLEST SERPL-MCNC: 192 MG/DL
FASTING STATUS PATIENT QL REPORTED: YES
HDLC SERPL-MCNC: 43 MG/DL
LDLC SERPL CALC-MCNC: 109 MG/DL
NONHDLC SERPL-MCNC: 149 MG/DL
PSA SERPL DL<=0.01 NG/ML-MCNC: 1.42 NG/ML (ref 0–6.5)
TRIGL SERPL-MCNC: 198 MG/DL

## 2024-06-14 ENCOUNTER — TELEPHONE (OUTPATIENT)
Dept: INTERNAL MEDICINE | Facility: CLINIC | Age: 69
End: 2024-06-14
Payer: MEDICARE

## 2024-06-14 ENCOUNTER — NURSE TRIAGE (OUTPATIENT)
Dept: INTERNAL MEDICINE | Facility: CLINIC | Age: 69
End: 2024-06-14
Payer: MEDICARE

## 2024-06-14 DIAGNOSIS — U07.1 INFECTION DUE TO 2019 NOVEL CORONAVIRUS: Primary | ICD-10-CM

## 2024-06-14 NOTE — TELEPHONE ENCOUNTER
Nurse Triage SBAR    Is this a 2nd Level Triage? NO    Situation: Patient called stating that Covid home test was positive. Wondering if he should get Paxlovid.    Background: Patient's wife tested positive for Covid on 6/12/24.  Patient then tested today and got a positive results. Noticing signs of virus - achy, tired, sore throat, and nasal congestion.     Assessment:   1. COVID-19 DIAGNOSIS: Patient tested positive today after wife tested positive on Wednesday, 6/12/24  2. COVID-19 EXPOSURE: Yes, wife tested positive on Wednesday  3. ONSET: Today  4. WORST SYMPTOM: Tired, achy, slight sore throat and nasal congestion  5. COUGH: denies  6. FEVER: denies  7. RESPIRATORY STATUS: normal  8. BETTER-SAME-WORSE: Symptoms just started today  9. OTHER SYMPTOMS: No  10. HIGH RISK DISEASE: No      Protocol Recommended Disposition:   Home Care    Recommendation: Patient plans to call pharmacy to inquire pricing on Paxlovid.  Will call back if symptoms get worse.      Janeen Parisi RN on 6/14/2024 at 12:58 PM          Does the patient meet one of the following criteria for ADS visit consideration? No Covid positive     Reason for Disposition   COVID-19 diagnosed by positive lab test (e.g., PCR, rapid self-test kit) and mild symptoms (e.g., cough, fever, others) and no complications or SOB    Additional Information   Negative: SEVERE difficulty breathing (e.g., struggling for each breath, speaks in single words)   Negative: Difficult to awaken or acting confused (e.g., disoriented, slurred speech)   Negative: Bluish (or gray) lips or face now   Negative: Shock suspected (e.g., cold/pale/clammy skin, too weak to stand, low BP, rapid pulse)   Negative: Sounds like a life-threatening emergency to the triager   Negative: Diagnosed or suspected COVID-19 and symptoms lasting 3 or more weeks   Negative: COVID-19 exposure and no symptoms   Negative: COVID-19 vaccine reaction suspected (e.g., fever, headache, muscle aches)  occurring 1 to 3 days after getting vaccine   Negative: COVID-19 vaccine, questions about   Negative: Lives with someone known to have influenza (flu test positive) and flu-like symptoms (e.g., cough, runny nose, sore throat, SOB; with or without fever)   Negative: Possible COVID-19 symptoms and triager concerned about severity of symptoms or other causes   Negative: COVID-19 and breastfeeding, questions about   Negative: SEVERE or constant chest pain or pressure  (Exception: Mild central chest pain, present only when coughing.)   Negative: MODERATE difficulty breathing (e.g., speaks in phrases, SOB even at rest, pulse 100-120)   Negative: Headache and stiff neck (can't touch chin to chest)   Negative: Oxygen level (e.g., pulse oximetry) 90% or lower   Negative: Chest pain or pressure  (Exception: MILD central chest pain, present only when coughing.)   Negative: Drinking very little and dehydration suspected (e.g., no urine > 12 hours, very dry mouth, very lightheaded)   Negative: Patient sounds very sick or weak to the triager   Negative: MILD difficulty breathing (e.g., minimal/no SOB at rest, SOB with walking, pulse <100)   Negative: Fever > 103 F (39.4 C)   Negative: Fever > 101 F (38.3 C) and over 60 years of age   Negative: Fever > 100.0 F (37.8 C) and bedridden (e.g., CVA, chronic illness, recovering from surgery)   Negative: HIGH RISK patient (e.g., weak immune system, age > 64 years, obesity with BMI of 30 or higher, pregnant, chronic lung disease or other chronic medical condition) and COVID symptoms (e.g., cough, fever)  (Exceptions: Already seen by doctor or NP/PA and no new or worsening symptoms.)   Negative: HIGH RISK patient and influenza is widespread in the community and ONE OR MORE respiratory symptoms: cough, sore throat, runny or stuffy nose   Negative: HIGH RISK patient and influenza exposure within the last 7 days and ONE OR MORE respiratory symptoms: cough, sore throat, runny or stuffy nose    Negative: Oxygen level (e.g., pulse oximetry) 91 to 94%   Negative: COVID-19 infection suspected by caller or triager and mild symptoms (cough, fever, or others) and negative COVID-19 rapid test   Negative: Fever present > 3 days (72 hours)   Negative: Fever returns after gone for over 24 hours and symptoms worse or not improved   Negative: Continuous (nonstop) coughing interferes with work or school and no improvement using cough treatment per Care Advice   Negative: Cough present > 3 weeks   Negative: COVID-19 diagnosed by positive lab test (e.g., PCR, rapid self-test kit) and NO symptoms (e.g., cough, fever, others)    Protocols used: Coronavirus (COVID-19) Diagnosed or [id number]

## 2024-06-14 NOTE — TELEPHONE ENCOUNTER
RN COVID TREATMENT VISIT  06/14/24      The patient has been triaged and does not require a higher level of care.    Abdulaziz Rendon  68 year old  Current weight? 200 Ibs     Has the patient been seen by a primary care provider at an [facility] or [facility] within the past two years? Yes.   Have you been in close proximity to/do you have a known exposure to a person with a confirmed case of influenza? No.     General treatment eligibility:  Date of positive COVID test (PCR or at home)?  6/14/24    Are you or have you been hospitalized for this COVID-19 infection? No.   Have you received monoclonal antibodies or antiviral treatment for COVID-19 since this positive test? No.   Do you have any of the following conditions that place you at risk of being very sick from COVID-19?   - Age 50 years or older  Yes, patient has at least one high risk condition as noted above.     Current COVID symptoms:   - cough  - fatigue  - muscle or body aches  - sore throat  - congestion or runny nose  Yes. Patient has at least one symptom as selected.     How many days since symptoms started? 5 days or less. Established patient, 12 years or older weighing at least 88.2 lbs, who has symptoms that started in the past 5 days, has not been hospitalized nor received treatment already, and is at risk for being very sick from COVID-19.     Treatment eligibility by RN:  Are you currently pregnant or nursing? No  Do you have a clinically significant hypersensitivity to nirmatrelvir or ritonavir, or toxic epidermal necrolysis (TEN) or Marley-Andrea Syndrome? No  Do you have a history of hepatitis, any hepatic impairment on the Problem List (such as Child-Tan Class C, cirrhosis, fatty liver disease, alcoholic liver disease), or was the last liver lab (hepatic panel, ALT, AST, ALK Phos, bilirubin) elevated in the past 6 months? No  Do you have any history of severe renal impairment (eGFR < 30mL/min)? No    Is  patient eligible to continue? Yes, patient meets all eligibility requirements for the RN COVID treatment (as denoted by all no responses above).     Current Outpatient Medications   Medication Sig Dispense Refill    ascorbic acid (ASCORBIC ACID WITH DION HIPS) 500 MG tablet Take 500 mg by mouth daily      cholecalciferol, vitamin D3, (VITAMIN D3) 50 mcg (2,000 unit) capsule Take 1,000 Units by mouth daily      co-enzyme Q-10 30 mg capsule Take 30 mg by mouth four times a week      FLAXSEED ORAL Take by mouth daily      lisinopril (ZESTRIL) 40 MG tablet Take 1 tablet (40 mg) by mouth daily 90 tablet 3    magnesium oxide 400 MG tablet Take by mouth every 12 hours      multivitamin therapeutic (THERAGRAN) tablet [MULTIVITAMIN THERAPEUTIC (THERAGRAN) TABLET] Take 1 tablet by mouth daily.      Potassium Gluconate 550 MG TABS       senna (SENOKOT) 8.6 MG tablet Take 1 tablet by mouth 2 times daily      ZINC ACETATE ORAL Take 25 mg by mouth         Medications from List 1 of the standing order (on medications that exclude the use of Paxlovid) that patient is taking: NONE. Is patient taking Enid's Wort? No  Is patient taking Miki's Wort or any meds from List 1? No.   Medications from List 2 of the standing order (on meds that provider needs to adjust) that patient is taking: NONE. Is patient on any of the meds from List 2? No.   Medications from List 3 of standing order (on meds that a RN needs to adjust) that patient is taking: NONE. Is patient on any meds from List 3? No.     Paxlovid has an approximate 90% reduction in hospitalization. Paxlovid can possibly cause altered sense of taste, diarrhea (loose, watery stools), high blood pressure, muscle aches.     Would patient like a Paxlovid prescription?   Yes.   Lab Results   Component Value Date    GFRESTIMATED >90 05/10/2024       Was last eGFR reduced? No, eGFR 60 or greater/ No Result on record. Patient can receive the normal renal function dose. Paxlovid Rx  sent to [place] pharmacy     [facility] on [address]     Temporary change to home medications: None    All medication adjustments (holds, etc) were discussed with the patient and patient was asked to repeat back (teachback) their med adjustment.  Did patient understand med adjustment? No medication adjustments needed.         Reviewed the following instructions with the patient:    Paxlovid (nimatrelvir and ritonavir)    How it works  Two medicines (nirmatrelvir and ritonavir) are taken together. They stop the virus from growing. Less amount of virus is easier for your body to fight.    How to take  Medicine comes in a daily container with both medicine tablets. Take by mouth twice daily (once in the morning, once at night) for 5 days.  The number of tablets to take varies by patient.  Don't chew or break capsules. Swallow whole.    When to take  Take as soon as possible after positive COVID-19 test result, and within 5 days of your first symptoms.    Possible side effects  Can cause altered sense of taste, diarrhea (loose, watery stools), high blood pressure, muscle aches.    Sreedhar Hardwick RN

## 2024-06-14 NOTE — TELEPHONE ENCOUNTER
FYI - Status Update    Who is Calling: patient    Update: Pt calling back after speaking with RN. His insurance does cover paxlovid. He would like to get the medication today.    Does caller want a call/response back: Yes     Could we send this information to you in [employer] or would you prefer to receive a phone call?:   Patient would prefer a phone call   Okay to leave a detailed message?: Yes at Cell number on file:    Telephone Information:   Mobile [phone]

## 2024-06-25 ENCOUNTER — VIRTUAL VISIT (OUTPATIENT)
Dept: FAMILY MEDICINE | Facility: CLINIC | Age: 69
End: 2024-06-25
Payer: MEDICARE

## 2024-06-25 DIAGNOSIS — G47.00 INSOMNIA, UNSPECIFIED TYPE: ICD-10-CM

## 2024-06-25 DIAGNOSIS — Z86.16 HISTORY OF COVID-19: ICD-10-CM

## 2024-06-25 DIAGNOSIS — R09.81 NASAL CONGESTION: ICD-10-CM

## 2024-06-25 DIAGNOSIS — J34.89 PAIN OF MAXILLARY SINUS: Primary | ICD-10-CM

## 2024-06-25 PROCEDURE — 99213 OFFICE O/P EST LOW 20 MIN: CPT | Mod: 95 | Performed by: FAMILY MEDICINE

## 2024-06-25 ASSESSMENT — ENCOUNTER SYMPTOMS: WHEEZING: 1

## 2024-06-25 NOTE — PROGRESS NOTES
"Abdulaziz is a 68 year old who is being evaluated via a billable video visit.    How would you like to obtain your AVS? MyChart  If the video visit is dropped, the invitation should be resent by: Text to cell phone: [phone]  Will anyone else be joining your video visit? No      Assessment & Plan     Pain of maxillary sinus  - amoxicillin-clavulanate (AUGMENTIN) 875-125 MG tablet  Dispense: 14 tablet; Refill: 0    Nasal congestion  - amoxicillin-clavulanate (AUGMENTIN) 875-125 MG tablet  Dispense: 14 tablet; Refill: 0    Insomnia, unspecified type  - amoxicillin-clavulanate (AUGMENTIN) 875-125 MG tablet  Dispense: 14 tablet; Refill: 0    History of COVID-19  - amoxicillin-clavulanate (AUGMENTIN) 875-125 MG tablet  Dispense: 14 tablet; Refill: 0      68 year old with h/o hypertension and recent covid infection 2 weeks ago seen today with sinus symptoms consistent with sinus infection. Will start on antibiotics augmentin 2 times daily x 7 days. No other redflag worrisome respiratory cardiac symptoms to prompt further evaluation. Should be seen in person in 3-5 days if not better.       BMI  Estimated body mass index is 30.27 kg/m  as calculated from the following:    Height as of 5/24/24: 1.753 m (5' 9\").    Weight as of 5/24/24: 93 kg (205 lb).           Subjective   Abdulaziz is a 68 year old, presenting for the following health issues:  Allergies (Nose is clogged, unable to sleep x 2 days)        5/24/2024     1:59 PM   Additional Questions   Roomed by Jacqueline       Video Start Time: 11:09 AM    Allergies    History of Present Illness       Reason for visit:  Sinus infection  Symptom onset:  1-3 days ago  Symptoms include:  Plugged nostrils, no drainage, interfering with sleep  Symptom intensity:  Severe  Symptom progression:  Worsening  What makes it worse:  No  What makes it better:  No    He eats 4 or more servings of fruits and vegetables daily.He consumes 2 sweetened beverage(s) daily.He exercises with " enough effort to increase his heart rate 30 to 60 minutes per day.  He exercises with enough effort to increase his heart rate 4 days per week.   He is taking medications regularly.               Review of Systems  Constitutional, HEENT, cardiovascular, pulmonary, gi and gu systems are negative, except as otherwise noted.      Objective           Vitals:  No vitals were obtained today due to virtual visit.    Physical Exam   GENERAL: alert and no distress  EYES: Eyes grossly normal to inspection.  No discharge or erythema, or obvious scleral/conjunctival abnormalities.  RESP: No audible wheeze, cough, or visible cyanosis.    SKIN: Visible skin clear. No significant rash, abnormal pigmentation or lesions.  NEURO: Cranial nerves grossly intact.  Mentation and speech appropriate for age.  PSYCH: Appropriate affect, tone, and pace of words    Office Visit on 05/24/2024   Component Date Value Ref Range Status    Cholesterol 05/24/2024 192  <200 mg/dL Final    Triglycerides 05/24/2024 198 (H)  <150 mg/dL Final    Direct Measure HDL 05/24/2024 43  >=40 mg/dL Final    LDL Cholesterol Calculated 05/24/2024 109 (H)  <=100 mg/dL Final    Non HDL Cholesterol 05/24/2024 149 (H)  <130 mg/dL Final    Patient Fasting > 8hrs? 05/24/2024 Yes   Final    Prostate Specific Antigen Screen 05/24/2024 1.42  0.00 - 6.50 ng/mL Final         Video-Visit Details    Type of service:  Video Visit   Video End Time:11:15 AM  Originating Location (pt. Location): Home    Distant Location (provider location):  Off-site  Platform used for Video Visit: Andie  Signed Electronically by: Ansley Koehler MD

## 2024-07-01 ENCOUNTER — HOSPITAL ENCOUNTER (OUTPATIENT)
Dept: CT IMAGING | Facility: CLINIC | Age: 69
Discharge: HOME | End: 2024-07-01
Attending: INTERNAL MEDICINE | Admitting: INTERNAL MEDICINE
Payer: MEDICARE

## 2024-07-01 DIAGNOSIS — E78.00 HYPERCHOLESTEREMIA: ICD-10-CM

## 2024-07-01 DIAGNOSIS — Z82.49 FAMILY HISTORY OF PREMATURE CORONARY ARTERY DISEASE: ICD-10-CM

## 2024-07-01 LAB
CV CALCIUM SCORING AGATSON LAD: 401
CV CALCIUM SCORING AGATSTON CX: 5
CV CALCIUM SCORING AGATSTON RCA: 359
CV CALCIUM SCORING AGATSTON TOTAL: 765

## 2024-07-01 PROCEDURE — 75571 CT HRT W/O DYE W/CA TEST: CPT | Mod: ME

## 2024-07-01 PROCEDURE — G1010 CDSM STANSON: HCPCS | Performed by: STUDENT IN AN ORGANIZED HEALTH CARE EDUCATION/TRAINING PROGRAM

## 2024-07-01 PROCEDURE — 75571 CT HRT W/O DYE W/CA TEST: CPT | Mod: 26 | Performed by: STUDENT IN AN ORGANIZED HEALTH CARE EDUCATION/TRAINING PROGRAM

## 2024-07-07 DIAGNOSIS — R93.1 ELEVATED CORONARY ARTERY CALCIUM SCORE: ICD-10-CM

## 2024-07-07 DIAGNOSIS — Z51.81 ENCOUNTER FOR THERAPEUTIC DRUG MONITORING: ICD-10-CM

## 2024-07-07 DIAGNOSIS — E78.00 HYPERCHOLESTEREMIA: Primary | ICD-10-CM

## 2024-07-07 DIAGNOSIS — I77.810 ASCENDING AORTA DILATATION (H): ICD-10-CM

## 2024-07-07 RX ORDER — ROSUVASTATIN CALCIUM 20 MG/1
20 TABLET, COATED ORAL DAILY
Qty: 90 TABLET | Refills: 3 | Status: SHIPPED | OUTPATIENT
Start: 2024-07-07

## 2024-08-30 ENCOUNTER — TRANSFERRED RECORDS (OUTPATIENT)
Dept: HEALTH INFORMATION MANAGEMENT | Facility: CLINIC | Age: 69
End: 2024-08-30
Payer: MEDICARE

## 2024-09-13 ENCOUNTER — MEDICAL CORRESPONDENCE (OUTPATIENT)
Dept: HEALTH INFORMATION MANAGEMENT | Facility: CLINIC | Age: 69
End: 2024-09-13
Payer: MEDICARE

## 2024-09-23 ENCOUNTER — TRANSFERRED RECORDS (OUTPATIENT)
Dept: HEALTH INFORMATION MANAGEMENT | Facility: CLINIC | Age: 69
End: 2024-09-23
Payer: MEDICARE

## 2024-09-26 PROBLEM — G47.33 OSA (OBSTRUCTIVE SLEEP APNEA): Status: ACTIVE | Noted: 2024-09-26

## 2024-10-08 ENCOUNTER — LAB (OUTPATIENT)
Dept: LAB | Facility: CLINIC | Age: 69
End: 2024-10-08
Payer: MEDICARE

## 2024-10-08 DIAGNOSIS — E78.00 HYPERCHOLESTEREMIA: ICD-10-CM

## 2024-10-08 DIAGNOSIS — Z51.81 ENCOUNTER FOR THERAPEUTIC DRUG MONITORING: ICD-10-CM

## 2024-10-08 LAB
ALBUMIN SERPL BCG-MCNC: 4.2 G/DL (ref 3.5–5.2)
ALP SERPL-CCNC: 80 U/L (ref 40–150)
ALT SERPL W P-5'-P-CCNC: 21 U/L (ref 0–70)
AST SERPL W P-5'-P-CCNC: 32 U/L (ref 0–45)
BILIRUB DIRECT SERPL-MCNC: <0.2 MG/DL (ref 0–0.3)
BILIRUB SERPL-MCNC: 0.6 MG/DL
CHOLEST SERPL-MCNC: 128 MG/DL
FASTING STATUS PATIENT QL REPORTED: YES
HDLC SERPL-MCNC: 49 MG/DL
LDLC SERPL CALC-MCNC: 62 MG/DL
NONHDLC SERPL-MCNC: 79 MG/DL
PROT SERPL-MCNC: 7.4 G/DL (ref 6.4–8.3)
TRIGL SERPL-MCNC: 85 MG/DL

## 2024-10-08 PROCEDURE — 80076 HEPATIC FUNCTION PANEL: CPT

## 2024-10-08 PROCEDURE — 80061 LIPID PANEL: CPT

## 2024-10-08 PROCEDURE — 36415 COLL VENOUS BLD VENIPUNCTURE: CPT

## 2024-10-29 ENCOUNTER — VIRTUAL VISIT (OUTPATIENT)
Dept: INTERNAL MEDICINE | Facility: CLINIC | Age: 69
End: 2024-10-29
Payer: MEDICARE

## 2024-10-29 DIAGNOSIS — K40.20 BILATERAL INGUINAL HERNIA WITHOUT OBSTRUCTION OR GANGRENE, RECURRENCE NOT SPECIFIED: ICD-10-CM

## 2024-10-29 DIAGNOSIS — R93.1 ELEVATED CORONARY ARTERY CALCIUM SCORE: ICD-10-CM

## 2024-10-29 DIAGNOSIS — R09.89 CHRONIC SINUS COMPLAINTS: Primary | ICD-10-CM

## 2024-10-29 PROCEDURE — 99443 PR PHYSICIAN TELEPHONE EVALUATION 21-30 MIN: CPT | Mod: 93 | Performed by: INTERNAL MEDICINE

## 2024-10-29 RX ORDER — FLUTICASONE PROPIONATE 50 MCG
2 SPRAY, SUSPENSION (ML) NASAL DAILY
COMMUNITY
Start: 2024-06-28

## 2024-10-29 RX ORDER — AZELASTINE 1 MG/ML
2 SPRAY, METERED NASAL 2 TIMES DAILY
COMMUNITY
Start: 2024-10-15

## 2024-10-29 NOTE — PROGRESS NOTES
Ketan is a 68 year old who is being evaluated via a billable telephone visit.    What phone number would you like to be contacted at? [phone]    How would you like to obtain your AVS? Pa  Originating Location (pt. Location): Home    Distant Location (provider location):  On-site    Assessment & Plan     Chronic sinus complaints  68-year-old male who is having chronic sinus congestion.  Evaluated by ENT with abnormal CT of sinuses with recommendation to proceed with balloon septoplasty and bilateral turbinate reduction.  Currently using azelastine spray and antihistamines along with Flonase without relief of symptoms.  Recommendation proceed with surgical intervention is completely reasonable     Elevated coronary artery calcium score  We discussed his elevated coronary calcium score over 800 suggesting at least moderate disease involving coronary arteries.  However, he is not experiencing any exertional chest pain or any increasing shortness of breath or decreased exercise tolerance.  It is safe to say that he does not likely have any severe coronary artery disease present and I would recommend continued risk factor modification taking rosuvastatin 20 mg daily and keeping blood pressure well-controlled.  Low threshold to proceed with a stress test should he notice any development of new symptoms that may suggest coronary ischemia.    Bilateral inguinal hernia without obstruction or gangrene, recurrence not specified  Evaluated by surgery with recommendation for bilateral inguinal hernia repair.  He will schedule a preop to have this completed.  In the absence of any symptoms, the above elevated coronary calcium score will not preclude him proceeding with surgery.  Risk would remain low.    He is due for a tetanus booster and I am recommending getting a Td vaccine at his pharmacy          BMI  Estimated body mass index is 30.27 kg/m  as calculated from the following:    Height as of 5/24/24: 1.753 m (5'  "9\").    Weight as of 5/24/24: 93 kg (205 lb).         Follow-up for preop in the next month.  He has an annual wellness visit scheduled for May 2025    Subjective   Ketan is a 68 year old, presenting for the following health issues: Telephone visit today to discuss chronic sinus symptoms along with elevated coronary calcium score and upcoming hernia repair.  See assessment and plan for details.  Follow Up (Talk about upcoming procedures)          Review of Systems  No chest pain      Objective    Vitals - Patient Reported  Systolic (Patient Reported): 126  Diastolic (Patient Reported): 84        Physical Exam   No no exam completed during telephone visit      Phone call duration: 21 minutes  Signed Electronically by: Jc Rg MD    "

## 2025-04-05 ENCOUNTER — VIRTUAL VISIT (OUTPATIENT)
Dept: URGENT CARE | Facility: CLINIC | Age: 70
End: 2025-04-05
Payer: MEDICARE

## 2025-04-05 DIAGNOSIS — R05.1 ACUTE COUGH: Primary | ICD-10-CM

## 2025-04-05 PROCEDURE — 98013 SYNCH AUDIO-ONLY EST LOW 20: CPT | Performed by: FAMILY MEDICINE

## 2025-04-05 RX ORDER — BENZONATATE 200 MG/1
200 CAPSULE ORAL 3 TIMES DAILY PRN
Qty: 21 CAPSULE | Refills: 0 | Status: SHIPPED | OUTPATIENT
Start: 2025-04-05

## 2025-04-05 NOTE — PROGRESS NOTES
Abdulaziz Rendon is a 69 year old male who is being evaluated via a billable telephone visit.        SUBJECTIVE:  Abdulaziz Rendon is an 69 year old male who presents for ongoing dry cough.   Onset was 7 day(s) ago.   Denies the following symptoms: fever, chills, runny nose, stuffy nose, wheezing, shortness of breath, ear pain bilateral, sore throat, nausea, vomiting, and diarrhea  Course of illness is improving.    Treatment measures tried include Decongestants with some relief of symptoms.      PMH:   has a past medical history of Abdominal pain, right upper quadrant (05/02/2024), Acute cholecystitis (05/04/2024), Acute hip pain, left (04/25/2022), Acute sinusitis with symptoms > 10 days (02/16/2024), Ascending aorta dilatation (11/05/2019), Chronic left shoulder pain (09/12/2022), Chronic sinus complaints (10/29/2024), Elevated coronary artery calcium score, Family history of premature coronary artery disease, Hearing loss, Herpes zoster (2014), History of bilateral knee replacement (05/08/2023), HTN (hypertension), Hypercholesteremia, Impaired fasting glucose, Inguinal hernia, Left-sided chest pain (11/05/2019), SEFERINO (obstructive sleep apnea) (09/26/2024), Osteoarthritis of both knees, Personal history of colonic polyps, Primary osteoarthritis of left hip (05/24/2022), and Recurrent shoulder dislocation.  Patient Active Problem List   Diagnosis    HTN (hypertension)    Hypercholesteremia    Family history of premature coronary artery disease    Inguinal hernia    Chronic left shoulder pain    Personal history of colon polyps, unspecified    History of bilateral knee replacement    History of left hip replacement    Decreased hearing of right ear    Elevated coronary artery calcium score    Ascending aorta dilatation    SEFERINO (obstructive sleep apnea)    Chronic sinus complaints     Social History     Socioeconomic History    Marital status:      Spouse name: None    Number of children: None     Years of education: None    Highest education level: None   Tobacco Use    Smoking status: Never     Passive exposure: Never    Smokeless tobacco: Never   Vaping Use    Vaping status: Never Used   Substance and Sexual Activity    Alcohol use: Yes     Comment: Alcoholic Drinks/day: weekend 2-3    Drug use: Never   Social History Narrative     Retired   with 2 children 3 grandchildren  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.            Social Drivers of Health     Financial Resource Strain: Low Risk  (5/23/2024)    Financial Resource Strain     Within the past 12 months, have you or your family members you live with been unable to get utilities (heat, electricity) when it was really needed?: No   Food Insecurity: Low Risk  (5/23/2024)    Food Insecurity     Within the past 12 months, did you worry that your food would run out before you got money to buy more?: No     Within the past 12 months, did the food you bought just not last and you didn t have money to get more?: No   Transportation Needs: Low Risk  (5/23/2024)    Transportation Needs     Within the past 12 months, has lack of transportation kept you from medical appointments, getting your medicines, non-medical meetings or appointments, work, or from getting things that you need?: No   Physical Activity: Sufficiently Active (5/23/2024)    Exercise Vital Sign     Days of Exercise per Week: 4 days     Minutes of Exercise per Session: 40 min   Stress: No Stress Concern Present (5/23/2024)    [employer] - Occupational Stress Questionnaire     Feeling of Stress : Not at all   Social Connections: Unknown (5/23/2024)    Social Connection and Isolation Panel [NHANES]     Frequency of Social Gatherings with Friends and Family: More than three times a week   Interpersonal Safety: Low Risk  (12/13/2024)    Interpersonal Safety     Do you feel physically and emotionally safe where you currently live?: Yes      Within the past 12 months, have you been hit, slapped, kicked or otherwise physically hurt by someone?: No     Within the past 12 months, have you been humiliated or emotionally abused in other ways by your partner or ex-partner?: No   Housing Stability: Low Risk  (5/23/2024)    Housing Stability     Do you have housing? : Yes     Are you worried about losing your housing?: No     Family History   Problem Relation Age of Onset    Cancer Mother         d 2014 Breast Cancer multiple myeloma    Cancer Father         neck    Coronary Artery Disease Father     Hyperlipidemia Father     Bipolar Disorder Brother        ALLERGIES:  Hydrochlorothiazide, Adhesive tape, and Tapentadol    Current Outpatient Medications   Medication Sig Dispense Refill    benzonatate (TESSALON) 200 MG capsule Take 1 capsule (200 mg) by mouth 3 times daily as needed for cough. 21 capsule 0    ascorbic acid (ASCORBIC ACID WITH DION HIPS) 500 MG tablet Take 500 mg by mouth daily      azelastine (ASTELIN) 0.1 % nasal spray Spray 2 sprays into both nostrils 2 times daily. (Patient not taking: Reported on 12/13/2024)      cholecalciferol, vitamin D3, (VITAMIN D3) 50 mcg (2,000 unit) capsule Take 1,000 Units by mouth daily      co-enzyme Q-10 30 mg capsule Take 30 mg by mouth four times a week      FLAXSEED ORAL Take by mouth daily      fluticasone (FLONASE) 50 MCG/ACT nasal spray Spray 2 sprays into both nostrils daily. SHAKE LIQUID AND USE (Patient not taking: Reported on 12/13/2024)      lisinopril (ZESTRIL) 40 MG tablet Take 1 tablet (40 mg) by mouth daily 90 tablet 3    magnesium oxide 400 MG tablet Take by mouth every 12 hours      multivitamin therapeutic (THERAGRAN) tablet [MULTIVITAMIN THERAPEUTIC (THERAGRAN) TABLET] Take 1 tablet by mouth daily.      Potassium Gluconate 550 MG TABS       rosuvastatin (CRESTOR) 20 MG tablet Take 1 tablet (20 mg) by mouth daily 90 tablet 3    ZINC ACETATE ORAL Take 25 mg by mouth       No current  facility-administered medications for this visit.         ROS:  All other review of systems negative except as noted elsewhere.      OBJECTIVE:    Vitals not done due to this being a virtual visit    GENERAL: alert and no distress  EYES: Eyes grossly normal to inspection.  No discharge or erythema, or obvious scleral/conjunctival abnormalities.  RESP: No audible wheeze, cough, or visible cyanosis.    SKIN: Visible skin clear. No significant rash, abnormal pigmentation or lesions.  PSYCH: Appropriate affect, tone, and pace of words      ASSESSMENT/PLAN:      ICD-10-CM    1. Acute cough  R05.1 benzonatate (TESSALON) 200 MG capsule          Patient is improving and would benefit from over-the-counter supportive measures that were discussed in detail.  In addition I did send a prescription of Tessalon Perles to aid with cough suppressant.    Follow up with primary care provider with any problems, questions or concerns or if symptoms worsen or fail to improve.     Patient verbalized understanding and is agreeable to plan.     See [facility] for orders, medications, letters, patient instructions    RICH Parra CNP  4/5/2025, 8:44 AM      Virtual Visit Details    Type of service:  Telephone Visit   Phone call duration: 11 minutes   Originating Location (pt. Location): Home  Distant Location (provider location):  Off-site  Telephone visit completed due to the patient did not have access to video, while the distant provider did.

## 2025-04-06 ENCOUNTER — HEALTH MAINTENANCE LETTER (OUTPATIENT)
Age: 70
End: 2025-04-06

## 2025-06-28 DIAGNOSIS — E78.00 HYPERCHOLESTEREMIA: ICD-10-CM

## 2025-06-29 SDOH — HEALTH STABILITY: PHYSICAL HEALTH: ON AVERAGE, HOW MANY DAYS PER WEEK DO YOU ENGAGE IN MODERATE TO STRENUOUS EXERCISE (LIKE A BRISK WALK)?: 5 DAYS

## 2025-06-29 SDOH — HEALTH STABILITY: PHYSICAL HEALTH: ON AVERAGE, HOW MANY MINUTES DO YOU ENGAGE IN EXERCISE AT THIS LEVEL?: 40 MIN

## 2025-06-29 ASSESSMENT — SOCIAL DETERMINANTS OF HEALTH (SDOH): HOW OFTEN DO YOU GET TOGETHER WITH FRIENDS OR RELATIVES?: MORE THAN THREE TIMES A WEEK

## 2025-06-30 ENCOUNTER — OFFICE VISIT (OUTPATIENT)
Dept: INTERNAL MEDICINE | Facility: CLINIC | Age: 70
End: 2025-06-30
Payer: MEDICARE

## 2025-06-30 VITALS
SYSTOLIC BLOOD PRESSURE: 110 MMHG | BODY MASS INDEX: 29.18 KG/M2 | HEIGHT: 69 IN | OXYGEN SATURATION: 96 % | HEART RATE: 75 BPM | WEIGHT: 197 LBS | DIASTOLIC BLOOD PRESSURE: 72 MMHG | TEMPERATURE: 97.6 F | RESPIRATION RATE: 16 BRPM

## 2025-06-30 DIAGNOSIS — R73.01 IMPAIRED FASTING GLUCOSE: ICD-10-CM

## 2025-06-30 DIAGNOSIS — Z12.5 SCREENING FOR PROSTATE CANCER: ICD-10-CM

## 2025-06-30 DIAGNOSIS — I10 PRIMARY HYPERTENSION: ICD-10-CM

## 2025-06-30 DIAGNOSIS — Z86.0100 PERSONAL HISTORY OF COLON POLYPS, UNSPECIFIED: ICD-10-CM

## 2025-06-30 DIAGNOSIS — Z00.00 ENCOUNTER FOR MEDICARE ANNUAL WELLNESS EXAM: Primary | ICD-10-CM

## 2025-06-30 DIAGNOSIS — G47.33 OSA (OBSTRUCTIVE SLEEP APNEA): ICD-10-CM

## 2025-06-30 DIAGNOSIS — I77.810 ASCENDING AORTA DILATATION: ICD-10-CM

## 2025-06-30 DIAGNOSIS — Z51.81 ENCOUNTER FOR THERAPEUTIC DRUG MONITORING: ICD-10-CM

## 2025-06-30 DIAGNOSIS — R93.1 ELEVATED CORONARY ARTERY CALCIUM SCORE: ICD-10-CM

## 2025-06-30 LAB
ALBUMIN SERPL BCG-MCNC: 4.2 G/DL (ref 3.5–5.2)
ALBUMIN UR-MCNC: ABNORMAL MG/DL
ALP SERPL-CCNC: 76 U/L (ref 40–150)
ALT SERPL W P-5'-P-CCNC: 17 U/L (ref 0–70)
ANION GAP SERPL CALCULATED.3IONS-SCNC: 11 MMOL/L (ref 7–15)
APPEARANCE UR: CLEAR
AST SERPL W P-5'-P-CCNC: 28 U/L (ref 0–45)
BACTERIA #/AREA URNS HPF: ABNORMAL /HPF
BILIRUB SERPL-MCNC: 0.6 MG/DL
BILIRUB UR QL STRIP: ABNORMAL
BUN SERPL-MCNC: 18.3 MG/DL (ref 8–23)
CALCIUM SERPL-MCNC: 9.5 MG/DL (ref 8.8–10.4)
CHLORIDE SERPL-SCNC: 101 MMOL/L (ref 98–107)
CHOLEST SERPL-MCNC: 124 MG/DL
COLOR UR AUTO: ABNORMAL
CREAT SERPL-MCNC: 0.93 MG/DL (ref 0.67–1.17)
EGFRCR SERPLBLD CKD-EPI 2021: 88 ML/MIN/1.73M2
ERYTHROCYTE [DISTWIDTH] IN BLOOD BY AUTOMATED COUNT: 13.1 % (ref 10–15)
FASTING STATUS PATIENT QL REPORTED: ABNORMAL
FASTING STATUS PATIENT QL REPORTED: NORMAL
GLUCOSE SERPL-MCNC: 101 MG/DL (ref 70–99)
GLUCOSE UR STRIP-MCNC: NEGATIVE MG/DL
HCO3 SERPL-SCNC: 25 MMOL/L (ref 22–29)
HCT VFR BLD AUTO: 44.5 % (ref 40–53)
HDLC SERPL-MCNC: 47 MG/DL
HGB BLD-MCNC: 15 G/DL (ref 13.3–17.7)
HGB UR QL STRIP: NEGATIVE
KETONES UR STRIP-MCNC: ABNORMAL MG/DL
LDLC SERPL CALC-MCNC: 56 MG/DL
LEUKOCYTE ESTERASE UR QL STRIP: NEGATIVE
MCH RBC QN AUTO: 30.1 PG (ref 26.5–33)
MCHC RBC AUTO-ENTMCNC: 33.7 G/DL (ref 31.5–36.5)
MCV RBC AUTO: 89 FL (ref 78–100)
MUCOUS THREADS #/AREA URNS LPF: PRESENT /LPF
NITRATE UR QL: NEGATIVE
NONHDLC SERPL-MCNC: 77 MG/DL
PH UR STRIP: 6 [PH] (ref 5–8)
PLATELET # BLD AUTO: 202 10E3/UL (ref 150–450)
POTASSIUM SERPL-SCNC: 4.6 MMOL/L (ref 3.4–5.3)
PROT SERPL-MCNC: 7.4 G/DL (ref 6.4–8.3)
PSA SERPL DL<=0.01 NG/ML-MCNC: 2.4 NG/ML (ref 0–6.5)
RBC # BLD AUTO: 4.99 10E6/UL (ref 4.4–5.9)
RBC #/AREA URNS AUTO: ABNORMAL /HPF
SODIUM SERPL-SCNC: 137 MMOL/L (ref 135–145)
SP GR UR STRIP: 1.02 (ref 1–1.03)
SQUAMOUS #/AREA URNS AUTO: ABNORMAL /LPF
TRIGL SERPL-MCNC: 104 MG/DL
TSH SERPL DL<=0.005 MIU/L-ACNC: 1.06 UIU/ML (ref 0.3–4.2)
UROBILINOGEN UR STRIP-ACNC: 0.2 E.U./DL
WBC # BLD AUTO: 8.2 10E3/UL (ref 4–11)
WBC #/AREA URNS AUTO: ABNORMAL /HPF

## 2025-06-30 PROCEDURE — G0439 PPPS, SUBSEQ VISIT: HCPCS | Performed by: INTERNAL MEDICINE

## 2025-06-30 PROCEDURE — 81001 URINALYSIS AUTO W/SCOPE: CPT | Performed by: INTERNAL MEDICINE

## 2025-06-30 PROCEDURE — 3074F SYST BP LT 130 MM HG: CPT | Performed by: INTERNAL MEDICINE

## 2025-06-30 PROCEDURE — 80053 COMPREHEN METABOLIC PANEL: CPT | Performed by: INTERNAL MEDICINE

## 2025-06-30 PROCEDURE — 99214 OFFICE O/P EST MOD 30 MIN: CPT | Mod: 25 | Performed by: INTERNAL MEDICINE

## 2025-06-30 PROCEDURE — 85027 COMPLETE CBC AUTOMATED: CPT | Performed by: INTERNAL MEDICINE

## 2025-06-30 PROCEDURE — 84443 ASSAY THYROID STIM HORMONE: CPT | Performed by: INTERNAL MEDICINE

## 2025-06-30 PROCEDURE — G0103 PSA SCREENING: HCPCS | Performed by: INTERNAL MEDICINE

## 2025-06-30 PROCEDURE — 36415 COLL VENOUS BLD VENIPUNCTURE: CPT | Performed by: INTERNAL MEDICINE

## 2025-06-30 PROCEDURE — 3078F DIAST BP <80 MM HG: CPT | Performed by: INTERNAL MEDICINE

## 2025-06-30 PROCEDURE — G2211 COMPLEX E/M VISIT ADD ON: HCPCS | Performed by: INTERNAL MEDICINE

## 2025-06-30 PROCEDURE — 80061 LIPID PANEL: CPT | Performed by: INTERNAL MEDICINE

## 2025-06-30 PROCEDURE — 83036 HEMOGLOBIN GLYCOSYLATED A1C: CPT | Performed by: INTERNAL MEDICINE

## 2025-06-30 RX ORDER — ROSUVASTATIN CALCIUM 20 MG/1
20 TABLET, COATED ORAL DAILY
Qty: 90 TABLET | Refills: 1 | Status: SHIPPED | OUTPATIENT
Start: 2025-06-30

## 2025-06-30 RX ORDER — LISINOPRIL 40 MG/1
40 TABLET ORAL DAILY
Qty: 90 TABLET | Refills: 3 | Status: SHIPPED | OUTPATIENT
Start: 2025-06-30

## 2025-06-30 NOTE — PATIENT INSTRUCTIONS
Patient Education   Preventive Care Advice   This is general advice given by our system to help you stay healthy. However, your care team may have specific advice just for you. Please talk to your care team about your preventive care needs.  Nutrition  Eat 5 or more servings of fruits and vegetables each day.  Try wheat bread, brown rice and whole grain pasta (instead of white bread, rice, and pasta).  Get enough calcium and vitamin D. Check the label on foods and aim for 100% of the RDA (recommended daily allowance).  Lifestyle  Exercise at least 150 minutes each week  (30 minutes a day, 5 days a week).  Do muscle strengthening activities 2 days a week. These help control your weight and prevent disease.  No smoking.  Wear sunscreen to prevent skin cancer.  Have a dental exam and cleaning every 6 months.  Annual eye exam  Yearly exams  See your health care team every year to talk about:  Any changes in your health.  Any medicines your care team has prescribed.  Preventive care, family planning, and ways to prevent chronic diseases.  Shots (vaccines)   COVID-19 shot: Recommended this fall if available  Flu shot: Get a flu shot every year.  Tetanus shot: Get a tetanus shot every 10 years.  Pneumococcal and RSV vaccines completed  Shingles shot (for age 50 and up).  Completed  General health tests  Diabetes screening:  Cholesterol test: Annually  Hepatitis C: Get tested at least once in your life.  Repeat CT chest to follow-up dilation of your thoracic aorta in 2026  Cancer screening tests  Colon cancer screening: It is important to start screening for colon cancer at age 45.  Colonoscopy will be due December 2027  Prostate cancer screening test: Annual PSA  For informational purposes only. Not to replace the advice of your health care provider. Copyright   2023 [employer]. All rights reserved. Clinically reviewed by the  [facility]. [employer] [id number] - REV 01/24.  Hearing Loss:  Care Instructions  Overview     Hearing loss is a sudden or slow decrease in how well you hear. It can range from slight to profound. Permanent hearing loss can occur with aging. It also can happen when you are exposed long-term to loud noise. Examples include listening to loud music, riding motorcycles, or being around other loud machines.  Hearing loss can affect your work and home life. It can make you feel lonely or depressed. You may feel that you have lost your independence. But hearing aids and other devices can help you hear better and feel connected to others.  Follow-up care is a key part of your treatment and safety. Be sure to make and go to all appointments, and call your doctor if you are having problems. It's also a good idea to know your test results and keep a list of the medicines you take.  How can you care for yourself at home?  Avoid loud noises whenever possible. This helps keep your hearing from getting worse.  Always wear hearing protection around loud noises.  Wear a hearing aid as directed.  A professional can help you pick a hearing aid that will work best for you.  You can also get hearing aids over the counter for mild to moderate hearing loss.  Have hearing tests as your doctor suggests. They can show whether your hearing has changed. Your hearing aid may need to be adjusted.  Use other devices as needed. These may include:  Telephone amplifiers and hearing aids that can connect to a television, stereo, radio, or microphone.  Devices that use lights or vibrations. These alert you to the doorbell, a ringing telephone, or a baby monitor.  Television closed-captioning. This shows the words at the bottom of the screen. Most new TVs can do this.  TTY (text telephone). This lets you type messages back and forth on the telephone instead of talking or listening. These devices are also called TDD. When messages are typed on the keyboard, they are sent over the phone line to a receiving TTY. The  "message is shown on a monitor.  Use text messaging, social media, and email if it is hard for you to communicate by telephone.  Try to learn a listening technique called speechreading. It is not lipreading. You pay attention to people's gestures, expressions, posture, and tone of voice. These clues can help you understand what a person is saying. Face the person you are talking to, and have them face you. Make sure the lighting is good. You need to see the other person's face clearly.  Think about counseling if you need help to adjust to your hearing loss.  When should you call for help?  Watch closely for changes in your health, and be sure to contact your doctor if:    You think your hearing is getting worse.     You have new symptoms, such as dizziness or nausea.   Where can you learn more?  Go to https://www.[employer].net/patiented  Enter R798 in the search box to learn more about \"Hearing Loss: Care Instructions.\"  Current as of: October 27, 2024  Content Version: 14.5    4596-2820 [employer].   Care instructions adapted under license by your healthcare professional. If you have questions about a medical condition or this instruction, always ask your healthcare professional. [employer] disclaims any warranty or liability for your use of this information.       "

## 2025-06-30 NOTE — PROGRESS NOTES
Preventive Care Visit  [facility]  Jc Rg MD, Internal Medicine  Jun 30, 2025      Assessment & Plan     Encounter for Medicare annual wellness exam  Immunizations are reviewed and everything is up-to-date.  He has a health directive.  Non-smoker.  Uses alcohol in moderation.  Regular exercise and good diet habits to maintain a healthy weight discussed.  Up to date with colonoscopies and this should be repeated in 2027.  Prostate exam is deferred but I will check a PSA for prostate cancer screening.  Dementia and depression screening completed.  He is getting an annual eye exam completed.  Seeing a dentist every 6 months recommended.  Skin exam performed and recommending regular use of sunblock.  Hepatitis C antibody for screening was normal.  Will screen for diabetes with fasting glucose.      Primary hypertension  Excellent blood pressure control with current dose of lisinopril taking 40 mg daily.  Tolerating medication without side effect and checking appropriate labs  - CBC with platelets; Future  - Comprehensive metabolic panel (BMP + Alb, Alk Phos, ALT, AST, Total. Bili, TP); Future  - TSH with free T4 reflex; Future  - UA Macroscopic with reflex to Microscopic and Culture - Lab Collect; Future  - lisinopril (ZESTRIL) 40 MG tablet; Take 1 tablet (40 mg) by mouth daily.    Elevated coronary artery calcium score  CT coronary calcium score over 800 suggesting at least moderate disease involving coronary arteries.  He is asymptomatic without any exertional chest pain.  He is taking rosuvastatin 20 mg daily and we will recheck lipid profile with goal of keeping LDL less than 70  - Lipid Profile (Chol, Trig, HDL, LDL calc); Future    SEFERINO (obstructive sleep apnea)  Difficult time wearing CPAP and will be transitioning to an appliance    Ascending aorta dilatation  Dilated ascending thoracic aorta measuring 4.6 cm in 2024 which had increased since 2019 when measured at 4.1 cm.   "This will need monitoring but it would be reasonable to plan on repeating a CTA chest in 2026.  Maintain good blood pressure control as currently seen    Personal history of colon polyps, unspecified  Last colonoscopy December 2022 with history of polyps.  Repeat every 5 years    Screening for prostate cancer  Exam deferred but will obtain PSA for prostate cancer screening  - PSA, screen; Future    Encounter for therapeutic drug monitoring  Monitor LFTs while on statin and renal function while taking ACE inhibitor  - Comprehensive metabolic panel (BMP + Alb, Alk Phos, ALT, AST, Total. Bili, TP); Future    The longitudinal plan of care for the diagnosis(es)/condition(s) as documented were addressed during this visit. Due to the added complexity in care, I will continue to support Ketan in the subsequent management and with ongoing continuity of care.   Patient has been advised of split billing requirements and indicates understanding: Yes        BMI  Estimated body mass index is 29.3 kg/m  as calculated from the following:    Height as of this encounter: 1.746 m (5' 8.75\").    Weight as of this encounter: 89.4 kg (197 lb).     Reviewed preventive health counseling, as reflected in patient instructions  Counseling  Appropriate preventive services were addressed with this patient via screening, questionnaire, or discussion as appropriate for fall prevention, nutrition, physical activity, Tobacco-use cessation, social engagement, weight loss and cognition.  Checklist reviewing preventive services available has been given to the patient.  Reviewed patient's diet, addressing concerns and/or questions.   Patient reported safety concerns were addressed today.The patient was provided with written information regarding signs of hearing loss.         Follow-up  Return in about 1 year (around 6/30/2026) for Annual wellness visit.    Subjective   Ketan is a 69 year old, presenting for the following: Here for his annual wellness " visit and to follow-up chronic medical problems including hypertension and elevated coronary artery calcium score and dilated thoracic aorta.  See assessment and plan for details  Wellness Visit (Fasting - No concerrns)        6/30/2025    12:48 PM   Additional Questions   Roomed by Svitlana KELLER            Advance Care Planning    Patient states has Health Care Directive and will send to [employer].        6/29/2025   General Health   How would you rate your overall physical health? Good   Feel stress (tense, anxious, or unable to sleep) Not at all         6/29/2025   Nutrition   Diet: Regular (no restrictions)         6/29/2025   Exercise   Days per week of moderate/strenous exercise 5 days   Average minutes spent exercising at this level 40 min         6/29/2025   Social Factors   Frequency of gathering with friends or relatives More than three times a week   Worry food won't last until get money to buy more No   Food not last or not have enough money for food? No   Do you have housing? (Housing is defined as stable permanent housing and does not include staying outside in a car, in a tent, in an abandoned building, in an overnight shelter, or couch-surfing.) Yes   Are you worried about losing your housing? No   Lack of transportation? No   Unable to get utilities (heat,electricity)? No         6/29/2025   Fall Risk   Fallen 2 or more times in the past year? No   Trouble with walking or balance? No          6/29/2025   Activities of Daily Living- Home Safety   Needs help with the following daily activites None of the above   Safety concerns in the home Throw rugs in the hallway    No grab bars in the bathroom       Multiple values from one day are sorted in reverse-chronological order         6/29/2025   Dental   Dentist two times every year? Yes         6/29/2025   Hearing Screening   Hearing concerns? (!) I NEED TO ASK PEOPLE TO SPEAK UP OR REPEAT THEMSELVES.    (!) IT'S HARD TO FOLLOW A CONVERSATION IN A  NOISY RESTAURANT OR CROWDED ROOM.   Would you like a referral for hearing testing? No       Multiple values from one day are sorted in reverse-chronological order         6/29/2025   Driving Risk Screening   Patient/family members have concerns about driving No         6/29/2025   General Alertness/Fatigue Screening   Have you been more tired than usual lately? No         6/29/2025   Urinary Incontinence Screening   Bothered by leaking urine in past 6 months No         Today's PHQ-2 Score:       6/29/2025     8:39 PM   PHQ-2 ( 1999 [employer])   Q1: Little interest or pleasure in doing things 0   Q2: Feeling down, depressed or hopeless 0   PHQ-2 Score 0    Q1: Little interest or pleasure in doing things Not at all   Q2: Feeling down, depressed or hopeless Not at all   PHQ-2 Score 0       Patient-reported           6/29/2025   Substance Use   Alcohol more than 3/day or more than 7/wk No   Do you have a current opioid prescription? No   How severe/bad is pain from 1 to 10? 0/10 (No Pain)   Do you use any other substances recreationally? No     Social History     Tobacco Use    Smoking status: Never     Passive exposure: Never    Smokeless tobacco: Never   Vaping Use    Vaping status: Never Used   Substance Use Topics    Alcohol use: Yes     Comment: Alcoholic Drinks/day: weekend 2-3    Drug use: Never           6/29/2025   AAA Screening   Family history of Abdominal Aortic Aneurysm (AAA)? Unsure   ASCVD Risk   The ASCVD Risk score (Bay RODRIGUEZ, et al., 2019) failed to calculate for the following reasons:    The valid total cholesterol range is 130 to 320 mg/dL            Reviewed and updated as needed this visit by Provider                    Past Medical History:   Diagnosis Date    Abdominal pain, right upper quadrant 05/02/2024    Acute cholecystitis 05/04/2024    Acute hip pain, left 04/25/2022    Acute sinusitis with symptoms > 10 days 02/16/2024    Ascending aorta dilatation 11/05/2019    Found on  echocardiogram April 2019 estimated 4.1 cm.  Measuring 4.6 cm 2024 on CT calcium score.  Recheck every 1 to 2 years    Chronic left shoulder pain 09/12/2022    Chronic sinus complaints 10/29/2024    Chronic sinus congestion with abnormal CT sinuses with plans for balloon septoplasty and turbinate reduction      Elevated coronary artery calcium score     Coronary calcium score 765, 80th percentile July 2024.  Begin rosuvastatin    Family history of premature coronary artery disease     Hearing loss     left ear since childhood    Herpes zoster 2014    History of bilateral knee replacement 05/08/2023    HTN (hypertension)     Hypercholesteremia     Impaired fasting glucose     Fasting glucose 113 December 2018.  Check A1c at return    Inguinal hernia     right    Left-sided chest pain 11/05/2019    Normal nuclear stress test 2019    SEFERINO (obstructive sleep apnea) 09/26/2024    Abnormal sleep study with diagnosis of moderate obstructive sleep apnea 2024      Osteoarthritis of both knees     Bilateral TKA 2015    Personal history of colonic polyps     Colonoscopy October 2016 normal.  Colonoscopy December 2022 with adenomatous polyp.  Repeat in 5 years    Primary osteoarthritis of left hip 05/24/2022    Left hip replacement    Recurrent shoulder dislocation      Past Surgical History:   Procedure Laterality Date    GALLBLADDER SURGERY      HERNIA REPAIR      TOTAL HIP ARTHROPLASTY Left     2022    TOTAL KNEE ARTHROPLASTY Bilateral 01/01/2015    VASECTOMY       Current providers sharing in care for this patient include:  Patient Care Team:  Jc Rg MD as PCP - General  Jc Rg MD as Assigned PCP    The following health maintenance items are reviewed in Epic and correct as of today:  Health Maintenance   Topic Date Due    BMP  05/10/2025    ANNUAL REVIEW OF HM ORDERS  05/10/2025    LIPID  10/08/2025    MEDICARE ANNUAL WELLNESS VISIT  06/30/2026    FALL RISK ASSESSMENT  06/30/2026    DIABETES  "SCREENING  05/10/2027    ADVANCE CARE PLANNING  05/09/2028    COLORECTAL CANCER SCREENING  12/06/2029    DTAP/TDAP/TD VACCINE (3 - Td or Tdap) 04/18/2035    HEPATITIS C SCREENING  Completed    PHQ-2 (once per calendar year)  Completed    INFLUENZA VACCINE  Completed    PNEUMOCOCCAL VACCINE 50+ YEARS  Completed    ZOSTER VACCINE  Completed    RSV VACCINE  Completed    COVID-19 VACCINE  Completed    HPV VACCINE  Aged Out    MENINGITIS VACCINE  Aged Out         Review of Systems  Constitutional, HEENT, cardiovascular, pulmonary, GI, , musculoskeletal, neuro, skin, endocrine and psych systems are negative, except as otherwise noted.     Objective    Exam  /72 (BP Location: Right arm, Patient Position: Sitting, Cuff Size: Adult Regular)   Pulse 75   Temp 97.6  F (36.4  C)   Resp 16   Ht 1.746 m (5' 8.75\")   Wt 89.4 kg (197 lb)   SpO2 96%   BMI 29.30 kg/m     Estimated body mass index is 29.3 kg/m  as calculated from the following:    Height as of this encounter: 1.746 m (5' 8.75\").    Weight as of this encounter: 89.4 kg (197 lb).    Physical Exam  EYES: Eyelids, conjunctiva, and sclera were normal. Pupils were normal. Cornea, iris, and lens were normal bilaterally.  HEAD, EARS, NOSE, MOUTH, AND THROAT: Head and face were normal. TMs and external auditory canals are normal.  Oropharynx normal  NECK: Neck appearance was normal. There were no neck masses and the thyroid was not enlarged and no nodules are felt.  No lymphadenopathy.  RESPIRATORY: Breathing pattern was normal and the chest moved symmetrically.   Lung sounds were normal and there were no rales or wheezes.  CARDIOVASCULAR: Heart rate and rhythm were normal.  S1 and S2 were normal and there were no extra sounds or murmurs. Peripheral pulses in arms and legs were normal.  There was no peripheral edema.  No carotid bruits.  GASTROINTESTINAL:  Bowel sounds were present.   Palpation detected no tenderness, mass, or enlarged organs.   RECTAL/PROSTATE: " Deferred  MUSCULOSKELETAL: Skeletal configuration was normal and muscle mass was normal for age. Joint appearance was overall normal.  LYMPHATIC: There were no enlarged nodes.  SKIN/HAIR/NAILS: Skin color was normal.  There were no concerning skin lesions.  NEUROLOGIC: The patient was alert and oriented to person, place, time, and circumstance. Speech was normal. Cranial nerves were normal. Motor strength was normal for age. The patient was normally coordinated.  Sensation intact.  PSYCHIATRIC:  Mood and affect were normal and the patient had normal recent and remote memory. The patient's judgment and insight were normal.         6/30/2025   Mini Cog   Clock Draw Score 2 Normal   3 Item Recall 3 objects recalled   Mini Cog Total Score 5              Signed Electronically by: Jc gR MD

## 2025-07-01 ENCOUNTER — RESULTS FOLLOW-UP (OUTPATIENT)
Dept: INTERNAL MEDICINE | Facility: CLINIC | Age: 70
End: 2025-07-01

## 2025-07-01 DIAGNOSIS — R97.20 RISING PSA LEVEL: Primary | ICD-10-CM

## 2025-07-01 DIAGNOSIS — R73.01 IMPAIRED FASTING GLUCOSE: Primary | ICD-10-CM

## 2025-07-01 LAB
EST. AVERAGE GLUCOSE BLD GHB EST-MCNC: 120 MG/DL
HBA1C MFR BLD: 5.8 % (ref 0–5.6)

## 2025-08-17 DIAGNOSIS — I10 PRIMARY HYPERTENSION: ICD-10-CM

## 2025-08-18 RX ORDER — LISINOPRIL 40 MG/1
40 TABLET ORAL DAILY
Qty: 90 TABLET | Refills: 3 | OUTPATIENT
Start: 2025-08-18